# Patient Record
Sex: FEMALE | Race: WHITE | NOT HISPANIC OR LATINO | Employment: OTHER | URBAN - METROPOLITAN AREA
[De-identification: names, ages, dates, MRNs, and addresses within clinical notes are randomized per-mention and may not be internally consistent; named-entity substitution may affect disease eponyms.]

---

## 2022-11-23 ENCOUNTER — HOSPITAL ENCOUNTER (EMERGENCY)
Facility: HOSPITAL | Age: 77
Discharge: HOME | End: 2022-11-23
Attending: EMERGENCY MEDICINE
Payer: MEDICARE

## 2022-11-23 ENCOUNTER — TELEPHONE (OUTPATIENT)
Dept: ORTHOPEDICS | Facility: CLINIC | Age: 77
End: 2022-11-23
Payer: MEDICARE

## 2022-11-23 VITALS
RESPIRATION RATE: 18 BRPM | OXYGEN SATURATION: 97 % | WEIGHT: 160 LBS | DIASTOLIC BLOOD PRESSURE: 87 MMHG | BODY MASS INDEX: 26.66 KG/M2 | HEIGHT: 65 IN | TEMPERATURE: 98 F | HEART RATE: 78 BPM | SYSTOLIC BLOOD PRESSURE: 153 MMHG
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
## 2022-11-23 DIAGNOSIS — S42.201A CLOSED FRACTURE OF PROXIMAL END OF RIGHT HUMERUS, UNSPECIFIED FRACTURE MORPHOLOGY, INITIAL ENCOUNTER: Primary | ICD-10-CM

## 2022-11-23 PROCEDURE — 99283 EMERGENCY DEPT VISIT LOW MDM: CPT

## 2022-11-23 PROCEDURE — 25000003 PHARM REV CODE 250: Performed by: EMERGENCY MEDICINE

## 2022-11-23 RX ORDER — HYDROCODONE BITARTRATE AND ACETAMINOPHEN 5; 325 MG/1; MG/1
1 TABLET ORAL EVERY 4 HOURS PRN
Qty: 18 TABLET | Refills: 0 | Status: SHIPPED | OUTPATIENT
Start: 2022-11-23 | End: 2022-12-19

## 2022-11-23 RX ORDER — HYDROCODONE BITARTRATE AND ACETAMINOPHEN 5; 325 MG/1; MG/1
1 TABLET ORAL
Status: COMPLETED | OUTPATIENT
Start: 2022-11-23 | End: 2022-11-23

## 2022-11-23 RX ADMIN — HYDROCODONE BITARTRATE AND ACETAMINOPHEN 1 TABLET: 5; 325 TABLET ORAL at 08:11

## 2022-11-23 NOTE — DISCHARGE INSTRUCTIONS
Maintain the sling and swath at all times.  Neurovascular checks the fingers.  Ice pack to the shoulder for 48 hours.  Norco every 4-6 hours if needed for pain.  Return to the ER as needed.  Make your appointment with the orthopedist today to be seen early next week.

## 2022-11-23 NOTE — ED NOTES
L arm pain S/P fall.  Good R hand movement> Pulses ++R wrist. Alert and oriented. Denies LOC. Home made T shirt sling in place. No RD.  Non distended abdomen.  Falls precautions. Call light in reach.

## 2022-11-23 NOTE — TELEPHONE ENCOUNTER
----- Message from Araceli Miller MA sent at 11/23/2022 10:57 AM CST -----  Contact:   darius   is panicking   Just left hospital   Call back

## 2022-11-23 NOTE — ED PROVIDER NOTES
Encounter Date: 11/23/2022       History     Chief Complaint   Patient presents with    Shoulder Injury     Patient c/o R shoulder injury after slip and fall this AM, denies LOC      77-year-old female who has a benign past medical history, presents emergency room accompanied by  with a history the patient stepped out of bed to go to bathroom at 4:00 a.m. this morning and slipped on a marble floor, landing on her right shoulder.  Since that time she is had intense pain in the shoulder area.  No complaints of any pain to the right elbow forearm hand or wrist.  No numbness to the hand or fingers.  She denies any head or neck pain.  No complaints of any pain to the anterior chest, abdomen or back.  No pelvic pain.  No pain to the left upper extremity.    Review of patient's allergies indicates:  No Known Allergies  No past medical history on file.  No past surgical history on file.  No family history on file.     Review of Systems   Constitutional:  Negative for chills, diaphoresis and fever.   HENT: Negative.  Negative for sore throat.    Respiratory: Negative.  Negative for shortness of breath.    Cardiovascular: Negative.  Negative for chest pain.   Gastrointestinal: Negative.  Negative for nausea.   Musculoskeletal:  Positive for arthralgias, joint swelling and myalgias. Negative for back pain and neck pain.   Skin:  Negative for color change and rash.   Neurological:  Negative for weakness and numbness.   All other systems reviewed and are negative.    Physical Exam     Initial Vitals [11/23/22 0641]   BP Pulse Resp Temp SpO2   122/81 100 18 98.1 °F (36.7 °C) 99 %      MAP       --         Physical Exam    Vitals reviewed.  Constitutional: She appears well-developed and well-nourished. She is not diaphoretic. No distress.   HENT:   Head: Normocephalic and atraumatic.   Nose: Nose normal.   Mouth/Throat: Oropharynx is clear and moist. No oropharyngeal exudate.   Eyes: Conjunctivae are normal. Pupils are  equal, round, and reactive to light.   Neck: Neck supple. No JVD present.   Normal range of motion.  Cardiovascular:  Normal rate, regular rhythm, normal heart sounds and intact distal pulses.     Exam reveals no gallop and no friction rub.       No murmur heard.  Pulmonary/Chest: Breath sounds normal. No respiratory distress. She has no wheezes. She has no rhonchi. She has no rales. She exhibits no tenderness.   Abdominal: Abdomen is soft. Bowel sounds are normal. She exhibits no distension. There is no abdominal tenderness. There is no rebound and no guarding.   Musculoskeletal:         General: No tenderness or edema. Normal range of motion.      Cervical back: Normal range of motion and neck supple.      Comments: Exquisite pain to palpation of the right deltoid area.  No pain over the distal humerus, elbow, forearm, wrist or hand.  Capillary refill is normal.  Sensations intact.     Lymphadenopathy:     She has no cervical adenopathy.   Neurological: She is alert and oriented to person, place, and time. She has normal strength. No sensory deficit. GCS score is 15. GCS eye subscore is 4. GCS verbal subscore is 5. GCS motor subscore is 6.   Skin: Skin is warm and dry. Capillary refill takes less than 2 seconds. No rash noted. No erythema. No pallor.   Psychiatric: She has a normal mood and affect. Her behavior is normal. Judgment and thought content normal.       ED Course   Procedures  Labs Reviewed - No data to display       Imaging Results              X-Ray Shoulder Trauma Right (In process)                      Medications   HYDROcodone-acetaminophen 5-325 mg per tablet 1 tablet (has no administration in time range)                Attending Attestation:             Attending ED Notes:   This 77-year-old female who had slipped and fell landing on her right shoulder this morning, now with right shoulder pain, has evidence of a fracture to the surgical neck of the humerus that is comminuted.  She was to be  placed in a sling and swath given analgesics.  She is to follow up with Dr. Liang orthopedic surgery next week.  She is to maintain an ice pack 48 hours and take Norco as needed for pain.                 Clinical Impression:   Final diagnoses:  [W19.XXXA] Fall  [S42.201A] Closed fracture of proximal end of right humerus, unspecified fracture morphology, initial encounter (Primary)        ED Disposition Condition    Discharge Stable          ED Prescriptions       Medication Sig Dispense Start Date End Date Auth. Provider    HYDROcodone-acetaminophen (NORCO) 5-325 mg per tablet Take 1 tablet by mouth every 4 (four) hours as needed for Pain. 18 tablet 11/23/2022 -- Dominik Sebastian Jr., MD          Follow-up Information       Follow up With Specialties Details Why Contact Info    Kenny Liang MD Sports Medicine, Orthopedic Surgery Schedule an appointment as soon as possible for a visit in 5 days  [address] [place] [address] [address] [place] [address]  [phone]               Dominik Sebastian Jr., MD  11/23/22 0803       Dominik Sebastian Jr., MD  11/23/22 8159

## 2022-11-23 NOTE — TELEPHONE ENCOUNTER
Spoke with patients    Told him to have patient take pain medication as prescribed and can ice shoulder  Scheduled appointment for Monday

## 2022-11-28 ENCOUNTER — OFFICE VISIT (OUTPATIENT)
Dept: ORTHOPEDICS | Facility: CLINIC | Age: 77
End: 2022-11-28
Payer: MEDICARE

## 2022-11-28 VITALS — HEIGHT: 65 IN | BODY MASS INDEX: 26.66 KG/M2 | RESPIRATION RATE: 18 BRPM | WEIGHT: 160 LBS

## 2022-11-28 DIAGNOSIS — S42.291A CLOSED 3-PART FRACTURE OF PROXIMAL HUMERUS, RIGHT, INITIAL ENCOUNTER: Primary | ICD-10-CM

## 2022-11-28 PROCEDURE — 99999 PR PBB SHADOW E&M-EST. PATIENT-LVL III: CPT | Mod: PBBFAC,,, | Performed by: ORTHOPAEDIC SURGERY

## 2022-11-28 PROCEDURE — 23600 CLTX PROX HUMRL FX W/O MNPJ: CPT | Mod: RT,S$GLB,, | Performed by: ORTHOPAEDIC SURGERY

## 2022-11-28 PROCEDURE — 99204 OFFICE O/P NEW MOD 45 MIN: CPT | Mod: 57,S$GLB,, | Performed by: ORTHOPAEDIC SURGERY

## 2022-11-28 PROCEDURE — 23600 PR CLOSED RX PROX HUMERUS FRACTURE: ICD-10-PCS | Mod: RT,S$GLB,, | Performed by: ORTHOPAEDIC SURGERY

## 2022-11-28 PROCEDURE — 99999 PR PBB SHADOW E&M-EST. PATIENT-LVL III: ICD-10-PCS | Mod: PBBFAC,,, | Performed by: ORTHOPAEDIC SURGERY

## 2022-11-28 PROCEDURE — 99204 PR OFFICE/OUTPT VISIT, NEW, LEVL IV, 45-59 MIN: ICD-10-PCS | Mod: 57,S$GLB,, | Performed by: ORTHOPAEDIC SURGERY

## 2022-11-28 RX ORDER — OXYCODONE AND ACETAMINOPHEN 5; 325 MG/1; MG/1
1 TABLET ORAL EVERY 4 HOURS PRN
Qty: 28 TABLET | Refills: 0 | Status: SHIPPED | OUTPATIENT
Start: 2022-11-28 | End: 2022-12-19

## 2022-11-28 NOTE — PROGRESS NOTES
History reviewed. No pertinent past medical history.    History reviewed. No pertinent surgical history.    Current Outpatient Medications   Medication Sig    HYDROcodone-acetaminophen (NORCO) 5-325 mg per tablet Take 1 tablet by mouth every 4 (four) hours as needed for Pain.     No current facility-administered medications for this visit.       Review of patient's allergies indicates:  No Known Allergies    History reviewed. No pertinent family history.    Social History     Socioeconomic History    Marital status:        Chief Complaint:   Chief Complaint   Patient presents with    Right Shoulder - Injury       History of present illness:  This is a 77-year-old female seen after a fall on November 23rd.  Patient was seen at the hospital ER and diagnosed with a comminuted right proximal humerus fracture.  Patient slipped getting out of bed landed directly onto her right shoulder.  Pain is a 10/10.  She tried taking Norco but it made her sick.      Review of Systems:    Constitution: Negative for chills, fever, and sweats.  Negative for unexplained weight loss.    HENT:  Negative for headaches and blurry vision.    Cardiovascular:Negative for chest pain or irregular heart beat. Negative for hypertension.    Respiratory:  Negative for cough and shortness of breath.    Gastrointestinal: Negative for abdominal pain, heartburn, melena, nausea, and vomitting.    Genitourinary:  Negative bladder incontinence and dysuria.    Musculoskeletal:  See HPI    Neurological: Negative for numbness.    Psychiatric/Behavioral: Negative for depression.  The patient is not nervous/anxious.      Endocrine: Negative for polyuria    Hematologic/Lymphatic: Negative for bleeding problem.  Does not bruise/bleed easily.    Skin: Negative for poor would healing and rash      Physical Examination:    Vital Signs:    Vitals:    11/28/22 0844   Resp: 18       Body mass index is 26.63 kg/m².    This a well-developed, well nourished patient  in no acute distress.  They are alert and oriented and cooperative to examination.  Pt. walks without an antalgic gait.      Examination of the right shoulder shows moderate bruising and swelling in the shoulder and arm.  Patient has significant limitation range of motion due to pain and guarding.  Able to wiggle her thumb in move her fingers.  Intact light touch sensation over the deltoid and in the hand.    X-rays:  X-rays of the right shoulder ordered and reviewed which show comminuted right proximal humerus fracture with minimal displacement overall good alignment.     Assessment::  Right proximal humerus fracture    Plan:  Reviewed the findings with her and her family today.  Recommended non operative care given the good alignment of the fracture.  Placed her in an UltraSling today.  Okay to come out of this for shower only.  We will try her on Percocet to see if that is tolerated.  If not, we will try Ultram.  I will see her back in 3 weeks with another x-ray of the right shoulder.  We will possibly start some physical therapy at that point.    This note was created using IES voice recognition software that occasionally misinterpreted phrases or words.    Consult note is delivered via Epic messaging service.

## 2022-12-14 DIAGNOSIS — S42.291A CLOSED 3-PART FRACTURE OF PROXIMAL HUMERUS, RIGHT, INITIAL ENCOUNTER: Primary | ICD-10-CM

## 2022-12-19 ENCOUNTER — HOSPITAL ENCOUNTER (OUTPATIENT)
Dept: RADIOLOGY | Facility: HOSPITAL | Age: 77
Discharge: HOME | End: 2022-12-19
Attending: ORTHOPAEDIC SURGERY
Payer: MEDICARE

## 2022-12-19 ENCOUNTER — OFFICE VISIT (OUTPATIENT)
Dept: ORTHOPEDICS | Facility: CLINIC | Age: 77
End: 2022-12-19
Payer: MEDICARE

## 2022-12-19 VITALS — WEIGHT: 160 LBS | RESPIRATION RATE: 18 BRPM | HEIGHT: 65 IN | BODY MASS INDEX: 26.66 KG/M2

## 2022-12-19 DIAGNOSIS — S42.291A CLOSED 3-PART FRACTURE OF PROXIMAL HUMERUS, RIGHT, INITIAL ENCOUNTER: ICD-10-CM

## 2022-12-19 DIAGNOSIS — S42.291D CLOSED 3-PART FRACTURE OF PROXIMAL HUMERUS WITH ROUTINE HEALING, RIGHT: Primary | ICD-10-CM

## 2022-12-19 PROCEDURE — 3288F PR FALLS RISK ASSESSMENT DOCUMENTED: ICD-10-PCS | Mod: CPTII,S$GLB,, | Performed by: ORTHOPAEDIC SURGERY

## 2022-12-19 PROCEDURE — 1125F AMNT PAIN NOTED PAIN PRSNT: CPT | Mod: CPTII,S$GLB,, | Performed by: ORTHOPAEDIC SURGERY

## 2022-12-19 PROCEDURE — 1160F RVW MEDS BY RX/DR IN RCRD: CPT | Mod: CPTII,S$GLB,, | Performed by: ORTHOPAEDIC SURGERY

## 2022-12-19 PROCEDURE — 99024 POSTOP FOLLOW-UP VISIT: CPT | Mod: S$GLB,,, | Performed by: ORTHOPAEDIC SURGERY

## 2022-12-19 PROCEDURE — 99999 PR PBB SHADOW E&M-EST. PATIENT-LVL III: CPT | Mod: PBBFAC,,, | Performed by: ORTHOPAEDIC SURGERY

## 2022-12-19 PROCEDURE — 1159F PR MEDICATION LIST DOCUMENTED IN MEDICAL RECORD: ICD-10-PCS | Mod: CPTII,S$GLB,, | Performed by: ORTHOPAEDIC SURGERY

## 2022-12-19 PROCEDURE — 73030 XR SHOULDER COMPLETE 2 OR MORE VIEWS RIGHT: ICD-10-PCS | Mod: 26,RT,, | Performed by: RADIOLOGY

## 2022-12-19 PROCEDURE — 99024 PR POST-OP FOLLOW-UP VISIT: ICD-10-PCS | Mod: S$GLB,,, | Performed by: ORTHOPAEDIC SURGERY

## 2022-12-19 PROCEDURE — 1159F MED LIST DOCD IN RCRD: CPT | Mod: CPTII,S$GLB,, | Performed by: ORTHOPAEDIC SURGERY

## 2022-12-19 PROCEDURE — 1160F PR REVIEW ALL MEDS BY PRESCRIBER/CLIN PHARMACIST DOCUMENTED: ICD-10-PCS | Mod: CPTII,S$GLB,, | Performed by: ORTHOPAEDIC SURGERY

## 2022-12-19 PROCEDURE — 3288F FALL RISK ASSESSMENT DOCD: CPT | Mod: CPTII,S$GLB,, | Performed by: ORTHOPAEDIC SURGERY

## 2022-12-19 PROCEDURE — 4010F PR ACE/ARB THEARPY RXD/TAKEN: ICD-10-PCS | Mod: CPTII,S$GLB,, | Performed by: ORTHOPAEDIC SURGERY

## 2022-12-19 PROCEDURE — 4010F ACE/ARB THERAPY RXD/TAKEN: CPT | Mod: CPTII,S$GLB,, | Performed by: ORTHOPAEDIC SURGERY

## 2022-12-19 PROCEDURE — 1101F PR PT FALLS ASSESS DOC 0-1 FALLS W/OUT INJ PAST YR: ICD-10-PCS | Mod: CPTII,S$GLB,, | Performed by: ORTHOPAEDIC SURGERY

## 2022-12-19 PROCEDURE — 99999 PR PBB SHADOW E&M-EST. PATIENT-LVL III: ICD-10-PCS | Mod: PBBFAC,,, | Performed by: ORTHOPAEDIC SURGERY

## 2022-12-19 PROCEDURE — 1125F PR PAIN SEVERITY QUANTIFIED, PAIN PRESENT: ICD-10-PCS | Mod: CPTII,S$GLB,, | Performed by: ORTHOPAEDIC SURGERY

## 2022-12-19 PROCEDURE — 1101F PT FALLS ASSESS-DOCD LE1/YR: CPT | Mod: CPTII,S$GLB,, | Performed by: ORTHOPAEDIC SURGERY

## 2022-12-19 PROCEDURE — 73030 X-RAY EXAM OF SHOULDER: CPT | Mod: TC,PN,RT

## 2022-12-19 PROCEDURE — 73030 X-RAY EXAM OF SHOULDER: CPT | Mod: 26,RT,, | Performed by: RADIOLOGY

## 2022-12-19 RX ORDER — OXYCODONE AND ACETAMINOPHEN 5; 325 MG/1; MG/1
1 TABLET ORAL EVERY 6 HOURS PRN
Qty: 28 TABLET | Refills: 0 | Status: SHIPPED | OUTPATIENT
Start: 2022-12-19 | End: 2023-01-09 | Stop reason: SDUPTHER

## 2022-12-19 NOTE — PROGRESS NOTES
History reviewed. No pertinent past medical history.    History reviewed. No pertinent surgical history.    Current Outpatient Medications   Medication Sig    HYDROcodone-acetaminophen (NORCO) 5-325 mg per tablet Take 1 tablet by mouth every 4 (four) hours as needed for Pain. (Patient not taking: Reported on 12/19/2022)    oxyCODONE-acetaminophen (PERCOCET) 5-325 mg per tablet Take 1 tablet by mouth every 4 (four) hours as needed for Pain. (Patient not taking: Reported on 12/19/2022)     No current facility-administered medications for this visit.       Review of patient's allergies indicates:  No Known Allergies    History reviewed. No pertinent family history.    Social History     Socioeconomic History    Marital status:        Chief Complaint:   Chief Complaint   Patient presents with    Follow-up     follow up post right proximal humerus justice, doi 11/23/22       History of present illness:  This is a 77-year-old female seen after a fall on November 23rd.  Patient was seen at the hospital ER and diagnosed with a comminuted right proximal humerus fracture.  Patient slipped getting out of bed landed directly onto her right shoulder.  Pain is a 8/10.  Wearing the sling      Review of Systems:    Constitution: Negative for chills, fever, and sweats.  Negative for unexplained weight loss.    HENT:  Negative for headaches and blurry vision.    Cardiovascular:Negative for chest pain or irregular heart beat. Negative for hypertension.    Respiratory:  Negative for cough and shortness of breath.    Gastrointestinal: Negative for abdominal pain, heartburn, melena, nausea, and vomitting.    Genitourinary:  Negative bladder incontinence and dysuria.    Musculoskeletal:  See HPI    Neurological: Negative for numbness.    Psychiatric/Behavioral: Negative for depression.  The patient is not nervous/anxious.      Endocrine: Negative for polyuria    Hematologic/Lymphatic: Negative for bleeding problem.  Does not  bruise/bleed easily.    Skin: Negative for poor would healing and rash      Physical Examination:    Vital Signs:    Vitals:    12/19/22 0909   Resp: 18       Body mass index is 26.63 kg/m².    This a well-developed, well nourished patient in no acute distress.  They are alert and oriented and cooperative to examination.  Pt. walks without an antalgic gait.      Examination of the right shoulder shows resolved bruising and still has a little swelling in the shoulder and arm.  Patient has significant limitation range of motion due to pain and guarding.  Able to wiggle her thumb in move her fingers.  Intact light touch sensation over the deltoid and in the hand.    X-rays:  X-rays of the right shoulder ordered and reviewed which show comminuted right proximal humerus fracture with minimal displacement.  overall acceptable alignment.  Some inferior subluxation now     Assessment::  Right proximal humerus fracture    Plan:  Reviewed the findings with her and her family today.  Recommended non operative care in the sling still.  Refilled her Percocet.  Take them only as needed.  I will see her back in a month with another x-ray of the right shoulder.    This note was created using [employer] voice recognition software that occasionally misinterpreted phrases or words.    Consult note is delivered via Epic messaging service.

## 2023-01-09 DIAGNOSIS — S42.291A CLOSED 3-PART FRACTURE OF PROXIMAL HUMERUS, RIGHT, INITIAL ENCOUNTER: ICD-10-CM

## 2023-01-09 RX ORDER — OXYCODONE AND ACETAMINOPHEN 5; 325 MG/1; MG/1
1 TABLET ORAL EVERY 6 HOURS PRN
Qty: 28 TABLET | Refills: 0 | Status: SHIPPED | OUTPATIENT
Start: 2023-01-09 | End: 2023-01-18 | Stop reason: SDUPTHER

## 2023-01-18 DIAGNOSIS — S42.291A CLOSED 3-PART FRACTURE OF PROXIMAL HUMERUS, RIGHT, INITIAL ENCOUNTER: ICD-10-CM

## 2023-01-18 RX ORDER — OXYCODONE AND ACETAMINOPHEN 5; 325 MG/1; MG/1
1 TABLET ORAL EVERY 6 HOURS PRN
Qty: 28 TABLET | Refills: 0 | Status: SHIPPED | OUTPATIENT
Start: 2023-01-18 | End: 2023-01-26

## 2023-01-24 DIAGNOSIS — M25.511 RIGHT SHOULDER PAIN, UNSPECIFIED CHRONICITY: Primary | ICD-10-CM

## 2023-01-26 DIAGNOSIS — S42.291D CLOSED 3-PART FRACTURE OF PROXIMAL HUMERUS WITH ROUTINE HEALING, RIGHT: Primary | ICD-10-CM

## 2023-01-26 RX ORDER — HYDROCODONE BITARTRATE AND ACETAMINOPHEN 5; 325 MG/1; MG/1
1 TABLET ORAL EVERY 6 HOURS PRN
Qty: 28 TABLET | Refills: 0 | Status: SHIPPED | OUTPATIENT
Start: 2023-01-26 | End: 2023-02-02 | Stop reason: SDUPTHER

## 2023-01-26 NOTE — TELEPHONE ENCOUNTER
----- Message from Sharon Schuster sent at 1/26/2023  8:23 AM CST -----  Contact: pt  Type:  RX Refill Request    Who Called: pt    Refill or New Rx:refill    RX Name and Strength: oxyCODONE-acetaminophen (PERCOCET) 5-325 mg per tablet    How is the patient currently taking it? (ex. 1XDay):as directed    Is this a 30 day or 90 day RX:30    Preferred Pharmacy with phone number:      [facility] (Retail Pharmacy) - [facility] [address] [address]  [address] [facility] [address]  Phone: [phone] Fax: [phone]      Local or Mail Order:local    Ordering Provider:hilario    Would the patient rather a call back or a response via [facility]? call  Best Call Back Number:[phone]     Additional Information: states that she's in pain and has an appt reese for feb. 3. States that she needs something until appt. Please advise thank you

## 2023-01-26 NOTE — TELEPHONE ENCOUNTER
----- Message from Javier Echeverria sent at 1/26/2023  2:10 PM CST -----  Type: Needs Medical Advice  Who Called:  Patient    Pharmacy name and phone #:        [facility] (Retail Pharmacy) - [place], [place] - [address] [address]  [address] [facility] [address]  Phone: [phone] Fax: [phone]      Best Call Back Number: [phone]  Additional Information: Patient states that her refill isn't at the pharmacy:  oxyCODONE-acetaminophen (PERCOCET) 5-325 mg per tablet    Please call ASAP

## 2023-01-26 NOTE — TELEPHONE ENCOUNTER
----- Message from Araceli Miller MA sent at 1/26/2023  4:08 PM CST -----  Contact: pt  States hyrocodone refill   [facility]   Call back

## 2023-02-02 ENCOUNTER — OFFICE VISIT (OUTPATIENT)
Dept: ORTHOPEDICS | Facility: CLINIC | Age: 78
End: 2023-02-02
Payer: MEDICARE

## 2023-02-02 ENCOUNTER — HOSPITAL ENCOUNTER (OUTPATIENT)
Dept: RADIOLOGY | Facility: HOSPITAL | Age: 78
Discharge: HOME | End: 2023-02-02
Attending: ORTHOPAEDIC SURGERY
Payer: MEDICARE

## 2023-02-02 VITALS — BODY MASS INDEX: 26.66 KG/M2 | HEIGHT: 65 IN | RESPIRATION RATE: 18 BRPM | WEIGHT: 160 LBS

## 2023-02-02 DIAGNOSIS — M25.511 RIGHT SHOULDER PAIN, UNSPECIFIED CHRONICITY: Primary | ICD-10-CM

## 2023-02-02 DIAGNOSIS — M25.511 RIGHT SHOULDER PAIN, UNSPECIFIED CHRONICITY: ICD-10-CM

## 2023-02-02 DIAGNOSIS — S42.291D CLOSED 3-PART FRACTURE OF PROXIMAL HUMERUS WITH ROUTINE HEALING, RIGHT: ICD-10-CM

## 2023-02-02 DIAGNOSIS — S42.291D CLOSED 3-PART FRACTURE OF PROXIMAL HUMERUS WITH ROUTINE HEALING, RIGHT: Primary | ICD-10-CM

## 2023-02-02 PROCEDURE — 1101F PT FALLS ASSESS-DOCD LE1/YR: CPT | Mod: CPTII,S$GLB,, | Performed by: ORTHOPAEDIC SURGERY

## 2023-02-02 PROCEDURE — 99024 PR POST-OP FOLLOW-UP VISIT: ICD-10-PCS | Mod: S$GLB,,, | Performed by: ORTHOPAEDIC SURGERY

## 2023-02-02 PROCEDURE — 1159F MED LIST DOCD IN RCRD: CPT | Mod: CPTII,S$GLB,, | Performed by: ORTHOPAEDIC SURGERY

## 2023-02-02 PROCEDURE — 73030 X-RAY EXAM OF SHOULDER: CPT | Mod: TC,PN,RT

## 2023-02-02 PROCEDURE — 1159F PR MEDICATION LIST DOCUMENTED IN MEDICAL RECORD: ICD-10-PCS | Mod: CPTII,S$GLB,, | Performed by: ORTHOPAEDIC SURGERY

## 2023-02-02 PROCEDURE — 1160F PR REVIEW ALL MEDS BY PRESCRIBER/CLIN PHARMACIST DOCUMENTED: ICD-10-PCS | Mod: CPTII,S$GLB,, | Performed by: ORTHOPAEDIC SURGERY

## 2023-02-02 PROCEDURE — 1160F RVW MEDS BY RX/DR IN RCRD: CPT | Mod: CPTII,S$GLB,, | Performed by: ORTHOPAEDIC SURGERY

## 2023-02-02 PROCEDURE — 99024 POSTOP FOLLOW-UP VISIT: CPT | Mod: S$GLB,,, | Performed by: ORTHOPAEDIC SURGERY

## 2023-02-02 PROCEDURE — 73030 X-RAY EXAM OF SHOULDER: CPT | Mod: 26,RT,, | Performed by: RADIOLOGY

## 2023-02-02 PROCEDURE — 3288F PR FALLS RISK ASSESSMENT DOCUMENTED: ICD-10-PCS | Mod: CPTII,S$GLB,, | Performed by: ORTHOPAEDIC SURGERY

## 2023-02-02 PROCEDURE — 99999 PR PBB SHADOW E&M-EST. PATIENT-LVL II: ICD-10-PCS | Mod: PBBFAC,,, | Performed by: ORTHOPAEDIC SURGERY

## 2023-02-02 PROCEDURE — 99999 PR PBB SHADOW E&M-EST. PATIENT-LVL II: CPT | Mod: PBBFAC,,, | Performed by: ORTHOPAEDIC SURGERY

## 2023-02-02 PROCEDURE — 3288F FALL RISK ASSESSMENT DOCD: CPT | Mod: CPTII,S$GLB,, | Performed by: ORTHOPAEDIC SURGERY

## 2023-02-02 PROCEDURE — 73030 XR SHOULDER TRAUMA 3 VIEW RIGHT: ICD-10-PCS | Mod: 26,RT,, | Performed by: RADIOLOGY

## 2023-02-02 PROCEDURE — 1101F PR PT FALLS ASSESS DOC 0-1 FALLS W/OUT INJ PAST YR: ICD-10-PCS | Mod: CPTII,S$GLB,, | Performed by: ORTHOPAEDIC SURGERY

## 2023-02-02 RX ORDER — HYDROCODONE BITARTRATE AND ACETAMINOPHEN 5; 325 MG/1; MG/1
1 TABLET ORAL EVERY 6 HOURS PRN
Qty: 28 TABLET | Refills: 0 | Status: SHIPPED | OUTPATIENT
Start: 2023-02-02 | End: 2023-12-13

## 2023-02-02 NOTE — PROGRESS NOTES
History reviewed. No pertinent past medical history.    History reviewed. No pertinent surgical history.    Current Outpatient Medications   Medication Sig    HYDROcodone-acetaminophen (NORCO) 5-325 mg per tablet Take 1 tablet by mouth every 6 (six) hours as needed for Pain.     No current facility-administered medications for this visit.       Review of patient's allergies indicates:  No Known Allergies    History reviewed. No pertinent family history.    Social History     Socioeconomic History    Marital status:        Chief Complaint:   No chief complaint on file.      History of present illness:  This is a 77-year-old female seen after a fall on November 23rd.  Patient was seen at the hospital ER and diagnosed with a comminuted right proximal humerus fracture.  Patient slipped getting out of bed landed directly onto her right shoulder.  Pain is a 8/10.  Wearing the sling.  Right elbow and hand are more painful than the shoulder.  Pain keeps her up at night.  Getting better though.      Review of Systems:    Constitution: Negative for chills, fever, and sweats.  Negative for unexplained weight loss.    HENT:  Negative for headaches and blurry vision.    Cardiovascular:Negative for chest pain or irregular heart beat. Negative for hypertension.    Respiratory:  Negative for cough and shortness of breath.    Gastrointestinal: Negative for abdominal pain, heartburn, melena, nausea, and vomitting.    Genitourinary:  Negative bladder incontinence and dysuria.    Musculoskeletal:  See HPI    Neurological: Negative for numbness.    Psychiatric/Behavioral: Negative for depression.  The patient is not nervous/anxious.      Endocrine: Negative for polyuria    Hematologic/Lymphatic: Negative for bleeding problem.  Does not bruise/bleed easily.    Skin: Negative for poor would healing and rash      Physical Examination:    Vital Signs:    Vitals:    02/02/23 1442   Resp: 18       Body mass index is 26.63  kg/m².    This a well-developed, well nourished patient in no acute distress.  They are alert and oriented and cooperative to examination.  Pt. walks without an antalgic gait.      Examination of the right shoulder shows resolved bruising and still has a little swelling in the shoulder and arm.  Patient has significant limitation range of motion due to pain and guarding.  Able to wiggle her thumb in move her fingers.  Intact light touch sensation over the deltoid and in the hand.  Complains of numbness in the ulnar 3 digits.    X-rays:  X-rays of the right shoulder ordered and reviewed which show comminuted right proximal humerus fracture with minimal displacement.  overall acceptable alignment.  Some inferior subluxation now     Assessment::  Right proximal humerus fracture  Right ulnar neuropathy    Plan:  Reviewed the findings with her and her family today.  We will stop the sling.  Start some therapy on her arm.  Also ordered some PT for generalized conditioning.  Follow-up in 2 weeks with another x-ray of the right shoulder.    This note was created using M [employer] voice recognition software that occasionally misinterpreted phrases or words.    Consult note is delivered via Epic messaging service.

## 2023-02-03 ENCOUNTER — TELEPHONE (OUTPATIENT)
Dept: ORTHOPEDICS | Facility: CLINIC | Age: 78
End: 2023-02-03
Payer: MEDICARE

## 2023-02-03 NOTE — TELEPHONE ENCOUNTER
Abraham Liang MD; DANY Robertson Staff  Thank you for your referral to [facility]. We are unable to provide an Occupational Therapist due to staffing. We can have our Physical Therapist admit to home health and to assist as much as possible with OT needs. Is this okay? Please advise.

## 2023-02-07 PROCEDURE — G0180 PR HOME HEALTH MD CERTIFICATION: ICD-10-PCS | Mod: ,,, | Performed by: ORTHOPAEDIC SURGERY

## 2023-02-07 PROCEDURE — G0180 MD CERTIFICATION HHA PATIENT: HCPCS | Mod: ,,, | Performed by: ORTHOPAEDIC SURGERY

## 2023-02-27 ENCOUNTER — TELEPHONE (OUTPATIENT)
Dept: ORTHOPEDICS | Facility: CLINIC | Age: 78
End: 2023-02-27
Payer: MEDICARE

## 2023-02-27 DIAGNOSIS — S42.291D CLOSED 3-PART FRACTURE OF PROXIMAL HUMERUS WITH ROUTINE HEALING, RIGHT: Primary | ICD-10-CM

## 2023-02-27 NOTE — TELEPHONE ENCOUNTER
----- Message from Araceli Miller MA sent at 2/27/2023  1:02 PM CST -----  Contact: danae MARINO  Bedside commode needs order   Call back [phone]

## 2023-02-27 NOTE — TELEPHONE ENCOUNTER
[phone].   Left message stating order is in and asking for where they would like order sent.

## 2023-03-08 ENCOUNTER — EXTERNAL HOME HEALTH (OUTPATIENT)
Dept: HOME HEALTH SERVICES | Facility: HOSPITAL | Age: 78
End: 2023-03-08
Payer: MEDICARE

## 2023-03-08 ENCOUNTER — TELEPHONE (OUTPATIENT)
Dept: ORTHOPEDICS | Facility: CLINIC | Age: 78
End: 2023-03-08
Payer: MEDICARE

## 2023-03-08 DIAGNOSIS — S42.291D CLOSED 3-PART FRACTURE OF PROXIMAL HUMERUS WITH ROUTINE HEALING, RIGHT: Primary | ICD-10-CM

## 2023-03-08 NOTE — TELEPHONE ENCOUNTER
----- Message from Erica Xavier MA sent at 3/8/2023  3:26 PM CST -----  Contact: donya MARINO  Can you put these orders in please!  ----- Message -----  From: Araceli Miller MA  Sent: 3/8/2023   2:24 PM CST  To: Garth Robertson Staff    Orders to continue HH PT   Call back

## 2023-03-09 DIAGNOSIS — M25.511 RIGHT SHOULDER PAIN, UNSPECIFIED CHRONICITY: Primary | ICD-10-CM

## 2023-03-16 ENCOUNTER — OFFICE VISIT (OUTPATIENT)
Dept: ORTHOPEDICS | Facility: CLINIC | Age: 78
End: 2023-03-16
Payer: MEDICARE

## 2023-03-16 ENCOUNTER — HOSPITAL ENCOUNTER (OUTPATIENT)
Dept: RADIOLOGY | Facility: HOSPITAL | Age: 78
Discharge: HOME | End: 2023-03-16
Attending: ORTHOPAEDIC SURGERY
Payer: MEDICARE

## 2023-03-16 DIAGNOSIS — M25.511 RIGHT SHOULDER PAIN, UNSPECIFIED CHRONICITY: ICD-10-CM

## 2023-03-16 DIAGNOSIS — S42.291D CLOSED 3-PART FRACTURE OF PROXIMAL HUMERUS WITH ROUTINE HEALING, RIGHT: ICD-10-CM

## 2023-03-16 DIAGNOSIS — S42.291D CLOSED 3-PART FRACTURE OF PROXIMAL HUMERUS WITH ROUTINE HEALING, RIGHT: Primary | ICD-10-CM

## 2023-03-16 PROCEDURE — 73030 X-RAY EXAM OF SHOULDER: CPT | Mod: 26,RT,, | Performed by: RADIOLOGY

## 2023-03-16 PROCEDURE — 99214 PR OFFICE/OUTPT VISIT, EST, LEVL IV, 30-39 MIN: ICD-10-PCS | Mod: S$GLB,,, | Performed by: ORTHOPAEDIC SURGERY

## 2023-03-16 PROCEDURE — 73030 XR SHOULDER TRAUMA 3 VIEW RIGHT: ICD-10-PCS | Mod: 26,RT,, | Performed by: RADIOLOGY

## 2023-03-16 PROCEDURE — 73030 X-RAY EXAM OF SHOULDER: CPT | Mod: TC,PN,RT

## 2023-03-16 PROCEDURE — 1159F MED LIST DOCD IN RCRD: CPT | Mod: CPTII,S$GLB,, | Performed by: ORTHOPAEDIC SURGERY

## 2023-03-16 PROCEDURE — 1159F PR MEDICATION LIST DOCUMENTED IN MEDICAL RECORD: ICD-10-PCS | Mod: CPTII,S$GLB,, | Performed by: ORTHOPAEDIC SURGERY

## 2023-03-16 PROCEDURE — 1160F PR REVIEW ALL MEDS BY PRESCRIBER/CLIN PHARMACIST DOCUMENTED: ICD-10-PCS | Mod: CPTII,S$GLB,, | Performed by: ORTHOPAEDIC SURGERY

## 2023-03-16 PROCEDURE — 1160F RVW MEDS BY RX/DR IN RCRD: CPT | Mod: CPTII,S$GLB,, | Performed by: ORTHOPAEDIC SURGERY

## 2023-03-16 PROCEDURE — 99214 OFFICE O/P EST MOD 30 MIN: CPT | Mod: S$GLB,,, | Performed by: ORTHOPAEDIC SURGERY

## 2023-03-16 RX ORDER — OXYCODONE AND ACETAMINOPHEN 5; 325 MG/1; MG/1
1 TABLET ORAL EVERY 6 HOURS PRN
Qty: 28 TABLET | Refills: 0 | Status: ON HOLD | OUTPATIENT
Start: 2023-03-16 | End: 2023-03-23 | Stop reason: HOSPADM

## 2023-03-16 NOTE — PROGRESS NOTES
History reviewed. No pertinent past medical history.    History reviewed. No pertinent surgical history.    Current Outpatient Medications   Medication Sig    HYDROcodone-acetaminophen (NORCO) 5-325 mg per tablet Take 1 tablet by mouth every 6 (six) hours as needed for Pain.     No current facility-administered medications for this visit.       Review of patient's allergies indicates:  No Known Allergies    History reviewed. No pertinent family history.    Social History     Socioeconomic History    Marital status:        Chief Complaint:   No chief complaint on file.      History of present illness:  This is a 77-year-old female seen after a fall on November 23rd.  Patient was seen at the hospital ER and diagnosed with a comminuted right proximal humerus fracture.  Patient slipped getting out of bed landed directly onto her right shoulder.  Pain is a 8/10.  Wearing the sling.  Right elbow and hand are more painful than the shoulder.  Pain keeps her up at night.  Getting better though.  Home health PT seems to be helping.      Review of Systems:    Constitution: Negative for chills, fever, and sweats.  Negative for unexplained weight loss.    HENT:  Negative for headaches and blurry vision.    Cardiovascular:Negative for chest pain or irregular heart beat. Negative for hypertension.    Respiratory:  Negative for cough and shortness of breath.    Gastrointestinal: Negative for abdominal pain, heartburn, melena, nausea, and vomitting.    Genitourinary:  Negative bladder incontinence and dysuria.    Musculoskeletal:  See HPI    Neurological: Negative for numbness.    Psychiatric/Behavioral: Negative for depression.  The patient is not nervous/anxious.      Endocrine: Negative for polyuria    Hematologic/Lymphatic: Negative for bleeding problem.  Does not bruise/bleed easily.    Skin: Negative for poor would healing and rash      Physical Examination:    Vital Signs:    There were no vitals filed for this  visit.      There is no height or weight on file to calculate BMI.    This a well-developed, well nourished patient in no acute distress.  They are alert and oriented and cooperative to examination.  Pt. walks without an antalgic gait.      Examination of the right shoulder shows resolved bruising and still has a little swelling in the shoulder and arm.  Patient has significant limitation range of motion due to pain and guarding.  Able to wiggle her thumb in move her fingers.  Intact light touch sensation over the deltoid and in the hand.  Complains of numbness in the ulnar 3 digits.    X-rays:  X-rays of the right shoulder ordered and reviewed which show comminuted right proximal humerus fracture with minimal displacement.  overall acceptable alignment.       Assessment::  Right proximal humerus fracture       Plan:  Reviewed the findings with her and her family today.  Continue with the home PT for range of motion and strengthening.  Refilled her pain medicine.  Follow-up in 2 months with more x-rays of the right shoulder.    This note was created using M [email or web address] voice recognition software that occasionally misinterpreted phrases or words.    Consult note is delivered via Epic messaging service.

## 2023-03-21 ENCOUNTER — HOSPITAL ENCOUNTER (OUTPATIENT)
Facility: HOSPITAL | Age: 78
Discharge: HOME HEALTH | End: 2023-03-23
Attending: EMERGENCY MEDICINE | Admitting: INTERNAL MEDICINE
Payer: MEDICARE

## 2023-03-21 DIAGNOSIS — E87.1 HYPONATREMIA: ICD-10-CM

## 2023-03-21 DIAGNOSIS — R55 SYNCOPE: ICD-10-CM

## 2023-03-21 DIAGNOSIS — R07.9 CHEST PAIN: ICD-10-CM

## 2023-03-21 DIAGNOSIS — R55 SYNCOPE, UNSPECIFIED SYNCOPE TYPE: Primary | ICD-10-CM

## 2023-03-21 LAB
ALBUMIN SERPL BCP-MCNC: 4 G/DL (ref 3.5–5.2)
ALP SERPL-CCNC: 57 U/L (ref 55–135)
ALT SERPL W/O P-5'-P-CCNC: 20 U/L (ref 10–44)
ANION GAP SERPL CALC-SCNC: 11 MMOL/L (ref 8–16)
ANION GAP SERPL CALC-SCNC: 9 MMOL/L (ref 8–16)
AST SERPL-CCNC: 30 U/L (ref 10–40)
BASOPHILS # BLD AUTO: 0.03 K/UL (ref 0–0.2)
BASOPHILS NFR BLD: 0.4 % (ref 0–1.9)
BILIRUB SERPL-MCNC: 0.8 MG/DL (ref 0.1–1)
BNP SERPL-MCNC: 124 PG/ML (ref 0–99)
BUN SERPL-MCNC: 18 MG/DL (ref 8–23)
BUN SERPL-MCNC: 19 MG/DL (ref 8–23)
CALCIUM SERPL-MCNC: 9.3 MG/DL (ref 8.7–10.5)
CALCIUM SERPL-MCNC: 9.7 MG/DL (ref 8.7–10.5)
CHLORIDE SERPL-SCNC: 91 MMOL/L (ref 95–110)
CHLORIDE SERPL-SCNC: 92 MMOL/L (ref 95–110)
CO2 SERPL-SCNC: 22 MMOL/L (ref 23–29)
CO2 SERPL-SCNC: 24 MMOL/L (ref 23–29)
CREAT SERPL-MCNC: 0.6 MG/DL (ref 0.5–1.4)
CREAT SERPL-MCNC: 0.7 MG/DL (ref 0.5–1.4)
DIFFERENTIAL METHOD: ABNORMAL
EOSINOPHIL # BLD AUTO: 0.1 K/UL (ref 0–0.5)
EOSINOPHIL NFR BLD: 1 % (ref 0–8)
ERYTHROCYTE [DISTWIDTH] IN BLOOD BY AUTOMATED COUNT: 12.3 % (ref 11.5–14.5)
EST. GFR  (NO RACE VARIABLE): >60 ML/MIN/1.73 M^2
EST. GFR  (NO RACE VARIABLE): >60 ML/MIN/1.73 M^2
GLUCOSE SERPL-MCNC: 115 MG/DL (ref 70–110)
GLUCOSE SERPL-MCNC: 134 MG/DL (ref 70–110)
HCT VFR BLD AUTO: 37.7 % (ref 37–48.5)
HGB BLD-MCNC: 13.1 G/DL (ref 12–16)
IMM GRANULOCYTES # BLD AUTO: 0.03 K/UL (ref 0–0.04)
IMM GRANULOCYTES NFR BLD AUTO: 0.4 % (ref 0–0.5)
LYMPHOCYTES # BLD AUTO: 0.6 K/UL (ref 1–4.8)
LYMPHOCYTES NFR BLD: 8.7 % (ref 18–48)
MCH RBC QN AUTO: 32.2 PG (ref 27–31)
MCHC RBC AUTO-ENTMCNC: 34.7 G/DL (ref 32–36)
MCV RBC AUTO: 93 FL (ref 82–98)
MONOCYTES # BLD AUTO: 0.6 K/UL (ref 0.3–1)
MONOCYTES NFR BLD: 7.8 % (ref 4–15)
NEUTROPHILS # BLD AUTO: 5.9 K/UL (ref 1.8–7.7)
NEUTROPHILS NFR BLD: 81.7 % (ref 38–73)
NRBC BLD-RTO: 0 /100 WBC
PLATELET # BLD AUTO: 159 K/UL (ref 150–450)
PLATELET BLD QL SMEAR: ABNORMAL
PMV BLD AUTO: 9.6 FL (ref 9.2–12.9)
POTASSIUM SERPL-SCNC: 4.3 MMOL/L (ref 3.5–5.1)
POTASSIUM SERPL-SCNC: 4.3 MMOL/L (ref 3.5–5.1)
PROT SERPL-MCNC: 6.8 G/DL (ref 6–8.4)
RBC # BLD AUTO: 4.07 M/UL (ref 4–5.4)
SODIUM SERPL-SCNC: 122 MMOL/L (ref 136–145)
SODIUM SERPL-SCNC: 127 MMOL/L (ref 136–145)
TROPONIN I SERPL HS-MCNC: 20.8 PG/ML (ref 0–14.9)
WBC # BLD AUTO: 7.21 K/UL (ref 3.9–12.7)

## 2023-03-21 PROCEDURE — G0378 HOSPITAL OBSERVATION PER HR: HCPCS

## 2023-03-21 PROCEDURE — 93010 ELECTROCARDIOGRAM REPORT: CPT | Mod: ,,, | Performed by: GENERAL PRACTICE

## 2023-03-21 PROCEDURE — 25000003 PHARM REV CODE 250: Performed by: STUDENT IN AN ORGANIZED HEALTH CARE EDUCATION/TRAINING PROGRAM

## 2023-03-21 PROCEDURE — 84484 ASSAY OF TROPONIN QUANT: CPT | Performed by: EMERGENCY MEDICINE

## 2023-03-21 PROCEDURE — 25000003 PHARM REV CODE 250: Performed by: EMERGENCY MEDICINE

## 2023-03-21 PROCEDURE — 85025 COMPLETE CBC W/AUTO DIFF WBC: CPT | Performed by: EMERGENCY MEDICINE

## 2023-03-21 PROCEDURE — 63600175 PHARM REV CODE 636 W HCPCS: Performed by: INTERNAL MEDICINE

## 2023-03-21 PROCEDURE — 83880 ASSAY OF NATRIURETIC PEPTIDE: CPT | Performed by: EMERGENCY MEDICINE

## 2023-03-21 PROCEDURE — 36415 COLL VENOUS BLD VENIPUNCTURE: CPT | Performed by: INTERNAL MEDICINE

## 2023-03-21 PROCEDURE — 80048 BASIC METABOLIC PNL TOTAL CA: CPT | Mod: XB | Performed by: INTERNAL MEDICINE

## 2023-03-21 PROCEDURE — 93005 ELECTROCARDIOGRAM TRACING: CPT | Performed by: GENERAL PRACTICE

## 2023-03-21 PROCEDURE — 25000003 PHARM REV CODE 250: Performed by: INTERNAL MEDICINE

## 2023-03-21 PROCEDURE — 99285 EMERGENCY DEPT VISIT HI MDM: CPT | Mod: 25

## 2023-03-21 PROCEDURE — 96374 THER/PROPH/DIAG INJ IV PUSH: CPT

## 2023-03-21 PROCEDURE — 93010 EKG 12-LEAD: ICD-10-PCS | Mod: ,,, | Performed by: GENERAL PRACTICE

## 2023-03-21 PROCEDURE — 80053 COMPREHEN METABOLIC PANEL: CPT | Performed by: EMERGENCY MEDICINE

## 2023-03-21 RX ORDER — HYDROCODONE BITARTRATE AND ACETAMINOPHEN 5; 325 MG/1; MG/1
1 TABLET ORAL EVERY 6 HOURS PRN
Status: DISCONTINUED | OUTPATIENT
Start: 2023-03-21 | End: 2023-03-21

## 2023-03-21 RX ORDER — NALOXONE HCL 0.4 MG/ML
0.02 VIAL (ML) INJECTION
Status: DISCONTINUED | OUTPATIENT
Start: 2023-03-21 | End: 2023-03-23 | Stop reason: HOSPADM

## 2023-03-21 RX ORDER — LISINOPRIL 20 MG/1
20 TABLET ORAL DAILY
Status: DISCONTINUED | OUTPATIENT
Start: 2023-03-21 | End: 2023-03-23 | Stop reason: HOSPADM

## 2023-03-21 RX ORDER — CYCLOBENZAPRINE HCL 5 MG
5 TABLET ORAL 3 TIMES DAILY PRN
COMMUNITY
Start: 2023-01-10 | End: 2023-12-13 | Stop reason: SDUPTHER

## 2023-03-21 RX ORDER — SIMETHICONE 80 MG
1 TABLET,CHEWABLE ORAL 4 TIMES DAILY PRN
Status: DISCONTINUED | OUTPATIENT
Start: 2023-03-21 | End: 2023-03-23 | Stop reason: HOSPADM

## 2023-03-21 RX ORDER — TALC
9 POWDER (GRAM) TOPICAL NIGHTLY PRN
Status: DISCONTINUED | OUTPATIENT
Start: 2023-03-21 | End: 2023-03-23 | Stop reason: HOSPADM

## 2023-03-21 RX ORDER — ALBUTEROL SULFATE 1.25 MG/3ML
1.25 SOLUTION RESPIRATORY (INHALATION) EVERY 6 HOURS PRN
COMMUNITY
End: 2023-12-13

## 2023-03-21 RX ORDER — ACETAMINOPHEN 325 MG/1
650 TABLET ORAL EVERY 4 HOURS PRN
Status: DISCONTINUED | OUTPATIENT
Start: 2023-03-21 | End: 2023-03-23 | Stop reason: HOSPADM

## 2023-03-21 RX ORDER — SODIUM CHLORIDE 0.9 % (FLUSH) 0.9 %
10 SYRINGE (ML) INJECTION EVERY 6 HOURS PRN
Status: DISCONTINUED | OUTPATIENT
Start: 2023-03-21 | End: 2023-03-23 | Stop reason: HOSPADM

## 2023-03-21 RX ORDER — ONDANSETRON 2 MG/ML
4 INJECTION INTRAMUSCULAR; INTRAVENOUS EVERY 6 HOURS PRN
Status: DISCONTINUED | OUTPATIENT
Start: 2023-03-21 | End: 2023-03-23 | Stop reason: HOSPADM

## 2023-03-21 RX ORDER — SODIUM CHLORIDE 9 MG/ML
1000 INJECTION, SOLUTION INTRAVENOUS
Status: COMPLETED | OUTPATIENT
Start: 2023-03-21 | End: 2023-03-21

## 2023-03-21 RX ORDER — ALENDRONATE SODIUM 70 MG/1
70 TABLET ORAL
COMMUNITY
Start: 2023-01-10 | End: 2023-12-13

## 2023-03-21 RX ORDER — IBUPROFEN 200 MG
24 TABLET ORAL
Status: DISCONTINUED | OUTPATIENT
Start: 2023-03-21 | End: 2023-03-23 | Stop reason: HOSPADM

## 2023-03-21 RX ORDER — ALBUTEROL SULFATE 90 UG/1
AEROSOL, METERED RESPIRATORY (INHALATION)
COMMUNITY
Start: 2023-01-10 | End: 2023-12-13 | Stop reason: SDUPTHER

## 2023-03-21 RX ORDER — ENOXAPARIN SODIUM 100 MG/ML
40 INJECTION SUBCUTANEOUS EVERY 24 HOURS
Status: DISCONTINUED | OUTPATIENT
Start: 2023-03-21 | End: 2023-03-23 | Stop reason: HOSPADM

## 2023-03-21 RX ORDER — MAG HYDROX/ALUMINUM HYD/SIMETH 200-200-20
30 SUSPENSION, ORAL (FINAL DOSE FORM) ORAL 4 TIMES DAILY PRN
Status: DISCONTINUED | OUTPATIENT
Start: 2023-03-21 | End: 2023-03-23 | Stop reason: HOSPADM

## 2023-03-21 RX ORDER — ERGOCALCIFEROL 1.25 MG/1
50000 CAPSULE ORAL
COMMUNITY
Start: 2023-01-10 | End: 2023-12-13

## 2023-03-21 RX ORDER — OXYBUTYNIN CHLORIDE 10 MG/1
10 TABLET, EXTENDED RELEASE ORAL NIGHTLY
COMMUNITY
Start: 2022-12-02 | End: 2023-12-13

## 2023-03-21 RX ORDER — MORPHINE SULFATE 4 MG/ML
4 INJECTION, SOLUTION INTRAMUSCULAR; INTRAVENOUS EVERY 4 HOURS PRN
Status: DISCONTINUED | OUTPATIENT
Start: 2023-03-21 | End: 2023-03-23 | Stop reason: HOSPADM

## 2023-03-21 RX ORDER — OXYCODONE HYDROCHLORIDE 5 MG/1
5 TABLET ORAL EVERY 6 HOURS PRN
Status: DISCONTINUED | OUTPATIENT
Start: 2023-03-21 | End: 2023-03-23 | Stop reason: HOSPADM

## 2023-03-21 RX ORDER — ROSUVASTATIN CALCIUM 10 MG/1
10 TABLET, COATED ORAL NIGHTLY
COMMUNITY
Start: 2023-03-14 | End: 2023-12-13

## 2023-03-21 RX ORDER — LISINOPRIL AND HYDROCHLOROTHIAZIDE 20; 25 MG/1; MG/1
1 TABLET ORAL DAILY
Status: ON HOLD | COMMUNITY
Start: 2023-03-02 | End: 2023-03-23 | Stop reason: HOSPADM

## 2023-03-21 RX ORDER — ACETAMINOPHEN 500 MG
1000 TABLET ORAL EVERY 8 HOURS PRN
Status: DISCONTINUED | OUTPATIENT
Start: 2023-03-21 | End: 2023-03-23 | Stop reason: HOSPADM

## 2023-03-21 RX ORDER — ALBUTEROL SULFATE 90 UG/1
2 AEROSOL, METERED RESPIRATORY (INHALATION) EVERY 4 HOURS PRN
Status: DISCONTINUED | OUTPATIENT
Start: 2023-03-21 | End: 2023-03-21

## 2023-03-21 RX ORDER — PROCHLORPERAZINE EDISYLATE 5 MG/ML
5 INJECTION INTRAMUSCULAR; INTRAVENOUS EVERY 6 HOURS PRN
Status: DISCONTINUED | OUTPATIENT
Start: 2023-03-21 | End: 2023-03-23 | Stop reason: HOSPADM

## 2023-03-21 RX ORDER — OXYBUTYNIN CHLORIDE 10 MG/1
10 TABLET, EXTENDED RELEASE ORAL NIGHTLY
Status: DISCONTINUED | OUTPATIENT
Start: 2023-03-21 | End: 2023-03-23 | Stop reason: HOSPADM

## 2023-03-21 RX ORDER — IBUPROFEN 800 MG/1
800 TABLET ORAL EVERY 6 HOURS PRN
COMMUNITY
Start: 2022-12-09 | End: 2023-12-13

## 2023-03-21 RX ORDER — ALBUTEROL SULFATE 0.83 MG/ML
2.5 SOLUTION RESPIRATORY (INHALATION) EVERY 4 HOURS PRN
Status: DISCONTINUED | OUTPATIENT
Start: 2023-03-21 | End: 2023-03-23 | Stop reason: HOSPADM

## 2023-03-21 RX ORDER — GLUCAGON 1 MG
1 KIT INJECTION
Status: DISCONTINUED | OUTPATIENT
Start: 2023-03-21 | End: 2023-03-23 | Stop reason: HOSPADM

## 2023-03-21 RX ORDER — IBUPROFEN 200 MG
16 TABLET ORAL
Status: DISCONTINUED | OUTPATIENT
Start: 2023-03-21 | End: 2023-03-23 | Stop reason: HOSPADM

## 2023-03-21 RX ADMIN — LISINOPRIL 20 MG: 20 TABLET ORAL at 08:03

## 2023-03-21 RX ADMIN — SODIUM CHLORIDE 1000 ML: 0.9 INJECTION, SOLUTION INTRAVENOUS at 03:03

## 2023-03-21 RX ADMIN — OXYCODONE HYDROCHLORIDE 5 MG: 5 TABLET ORAL at 08:03

## 2023-03-21 RX ADMIN — OXYBUTYNIN CHLORIDE 10 MG: 10 TABLET, EXTENDED RELEASE ORAL at 08:03

## 2023-03-21 RX ADMIN — ONDANSETRON 4 MG: 2 INJECTION INTRAMUSCULAR; INTRAVENOUS at 10:03

## 2023-03-21 RX ADMIN — ACETAMINOPHEN 650 MG: 325 TABLET ORAL at 06:03

## 2023-03-21 NOTE — H&P
[facility] Medicine History & Physical Examination   Patient Name: Mirela Calderon  MRN: [id number]  Patient Class: OP- Observation   Admission Date: 3/21/2023 11:26 AM  Length of Stay: 0  Attending Physician: Matt Mclean MD  Primary Care Provider: Primary Doctor No  Face-to-Face encounter date: 03/21/2023  Code Status: Full Code  Chief Complaint: Loss of Consciousness (While playing cards, denies complaint at this time)        HISTORY OF PRESENT ILLNESS:   Mirela Calderon is a 77 y.o. White female with known history of essential hypertension presented to the emergency room for syncopal episode.  As per the patient she was playing cards with her  when she suddenly became very clammy followed by loss in consciousness for 2 minutes.  She regained consciousness however was confused for few minutes till the fire department in EMS arrived.  She had a similar episode a month ago when she was using bathroom.  She denies any other episodes.  She denies fall any new injury to the head.  She denies any other warning symptoms.  She reports decreased border intake and likes to drink beer and wine.   In the emergency room vitals and labs within normal limits.  Hospital medicine consulted for the workup of syncope.  The    REVIEW OF SYSTEMS:   10 Point Review of System was performed and was found to be negative except for that mentioned already in the HPI above.     PAST MEDICAL HISTORY:   Essential hypertension    PAST SURGICAL HISTORY:   Reviewed but not pertinent    ALLERGIES:   Reviewed but not pertinent  FAMILY HISTORY:   Reviewed but not pertinent  SOCIAL HISTORY:     Denies smoking    HOME MEDICATIONS:     Prior to Admission medications    Medication Sig Start Date End Date Taking? Authorizing Provider   albuterol (ACCUNEB) 1.25 mg/3 mL Nebu Take 1.25 mg by nebulization every 6 (six) hours as needed. Rescue   Yes Historical Provider   alendronate (FOSAMAX) 70 MG tablet Take 70 mg by  "mouth every 7 days. Saturdays 1/10/23  Yes Historical Provider   ergocalciferol (ERGOCALCIFEROL) 50,000 unit Cap Take 50,000 Units by mouth every 7 days. SATURDAYS 1/10/23  Yes Historical Provider   lisinopriL-hydrochlorothiazide (PRINZIDE,ZESTORETIC) 20-25 mg Tab Take 1 tablet by mouth once daily. 3/2/23  Yes Historical Provider   oxyCODONE-acetaminophen (PERCOCET) 5-325 mg per tablet Take 1 tablet by mouth every 6 (six) hours as needed for Pain. 3/16/23  Yes Kenny Liang MD   rosuvastatin (CRESTOR) 10 MG tablet Take 10 mg by mouth every evening. 3/14/23  Yes Historical Provider   albuterol (PROVENTIL/VENTOLIN HFA) 90 mcg/actuation inhaler Inhale into the lungs. 1/10/23   Historical Provider   cyclobenzaprine (FLEXERIL) 5 MG tablet Take 5 mg by mouth 3 (three) times daily as needed. 1/10/23   Historical Provider   HYDROcodone-acetaminophen (NORCO) 5-325 mg per tablet Take 1 tablet by mouth every 6 (six) hours as needed for Pain. 2/2/23   Kenny Liang MD   ibuprofen (ADVIL,MOTRIN) 800 MG tablet Take 800 mg by mouth every 6 (six) hours as needed. 12/9/22   Historical Provider   oxybutynin (DITROPAN-XL) 10 MG 24 hr tablet Take 10 mg by mouth every evening. 12/2/22   Historical Provider         PHYSICAL EXAM:   BP (!) 159/70   Pulse 97   Temp 97.9 °F (36.6 °C) (Oral)   Resp 19   Ht 5' 5" (1.651 m)   Wt 70.8 kg (156 lb)   SpO2 100%   BMI 25.96 kg/m²   Vitals Reviewed  General appearance: non-toxic and not acutely ill-appearingWhite female in no apparent distress.  Skin: No Rash.   Neuro: Motor and sensory exams grossly intact. Good tone. Power in all 4 extremities 5/5.   HENT: Atraumatic head. Moist mucous membranes of oral cavity.  Eyes: Normal extraocular movements.   Neck: Supple. No evidence of lymphadenopathy. No thyroidomegaly.  Lungs: Clear to auscultation bilaterally. No wheezing present.   Heart: Regular rate and rhythm. S1 and S2 present with no murmurs/gallop/rub. No pedal edema. No " JVD present.   Abdomen: Soft, non-distended, non-tender. No rebound tenderness/guarding. No masses or organomegaly. Bowel sounds are normal. Bladder is not palpable.   Extremities: No cyanosis, clubbing.  Psych/mental status: Alert and oriented. Cooperative. Responds appropriately to questions.   EMERGENCY DEPARTMENT LABS AND IMAGING:     Labs Reviewed   CBC W/ AUTO DIFFERENTIAL - Abnormal; Notable for the following components:       Result Value    MCH 32.2 (*)     Lymph # 0.6 (*)     Gran % 81.7 (*)     Lymph % 8.7 (*)     All other components within normal limits   COMPREHENSIVE METABOLIC PANEL - Abnormal; Notable for the following components:    Sodium 122 (*)     Chloride 91 (*)     CO2 22 (*)     Glucose 134 (*)     All other components within normal limits    Narrative:     Sodium critical result(s) repeated. Called and verbal readback   obtained from [facility], RN. by SLT1 03/21/2023 14:25   TROPONIN I HIGH SENSITIVITY - Abnormal; Notable for the following components:    Troponin I High Sensitivity 20.8 (*)     All other components within normal limits   B-TYPE NATRIURETIC PEPTIDE - Abnormal; Notable for the following components:     (*)     All other components within normal limits       X-Ray Chest AP Portable   Final Result      US Carotid Bilateral    (Results Pending)       ASSESSMENT & PLAN:   Mirela Calderon is a 77 y.o. female admitted for    Active Hospital Problems    Diagnosis    *Syncope    Hyponatremia      Plan  Admit to observation unit   Cardiac monitoring   Orthostatic vital thinks   Echocardiogram   Carotid ultrasound  Cardiology consultation  Hyponatremia likely secondary to thiazide use, likely chronic, will treat with IV fluids    Diet: Cardiac    DVT Prophylaxis: low molecular weight heparin and Encourage ambulation. OOB as tolerated.     Discharge Planning and Disposition:  Home with assistance from family    Expected LOS: 1-2 days        ________________________________________________________________________________    Face-to-Face encounter date: 03/21/2023  Encounter included review of the medical records, interviewing and examining the patient face-to-face, discussion with family and other health care providers including emergency medicine physician, admission orders, interpreting lab/test results and formulating a plan of care.   Medical Decision Making during this encounter was High Complexity due to syncope and hyponatremia  ________________________________________________________________________________    INPATIENT LIST OF MEDICATIONS   No current facility-administered medications for this encounter.    Current Outpatient Medications:     albuterol (ACCUNEB) 1.25 mg/3 mL Nebu, Take 1.25 mg by nebulization every 6 (six) hours as needed. Rescue, Disp: , Rfl:     alendronate (FOSAMAX) 70 MG tablet, Take 70 mg by mouth every 7 days. Saturdays, Disp: , Rfl:     ergocalciferol (ERGOCALCIFEROL) 50,000 unit Cap, Take 50,000 Units by mouth every 7 days. SATURDAYS, Disp: , Rfl:     lisinopriL-hydrochlorothiazide (PRINZIDE,ZESTORETIC) 20-25 mg Tab, Take 1 tablet by mouth once daily., Disp: , Rfl:     oxyCODONE-acetaminophen (PERCOCET) 5-325 mg per tablet, Take 1 tablet by mouth every 6 (six) hours as needed for Pain., Disp: 28 tablet, Rfl: 0    rosuvastatin (CRESTOR) 10 MG tablet, Take 10 mg by mouth every evening., Disp: , Rfl:     albuterol (PROVENTIL/VENTOLIN HFA) 90 mcg/actuation inhaler, Inhale into the lungs., Disp: , Rfl:     cyclobenzaprine (FLEXERIL) 5 MG tablet, Take 5 mg by mouth 3 (three) times daily as needed., Disp: , Rfl:     HYDROcodone-acetaminophen (NORCO) 5-325 mg per tablet, Take 1 tablet by mouth every 6 (six) hours as needed for Pain., Disp: 28 tablet, Rfl: 0    ibuprofen (ADVIL,MOTRIN) 800 MG tablet, Take 800 mg by mouth every 6 (six) hours as needed., Disp: , Rfl:     oxybutynin (DITROPAN-XL) 10 MG 24 hr tablet, Take 10 mg by  mouth every evening., Disp: , Rfl:       Scheduled Meds:  Continuous Infusions:  PRN Meds:.      Matt Mclean  [facility] Hospitalist  03/21/2023

## 2023-03-21 NOTE — ED PROVIDER NOTES
Encounter Date: 3/21/2023       History     Chief Complaint   Patient presents with    Loss of Consciousness     While playing cards, denies complaint at this time     77-year-old female presented emergency department after a syncopal episode.  Patient was sitting with her  and playing cards and then passed out and was unresponsive and woke up with fire department next to her.  Patient had a similar episode when she passed out about a week ago.  Patient currently denies any complaints.  Currently denies fever or chills or nausea vomiting or chest pain or shortness of breath or abdominal pain or weakness or numbness.  Patient said when she woke up she was not confused and was back at baseline.    Review of patient's allergies indicates:  No Known Allergies  No past medical history on file.  No past surgical history on file.  No family history on file.     Review of Systems   Constitutional: Negative.    HENT: Negative.     Eyes: Negative.    Respiratory: Negative.     Cardiovascular: Negative.  Negative for chest pain.   Gastrointestinal: Negative.    Endocrine: Negative.    Genitourinary: Negative.    Musculoskeletal: Negative.    Skin: Negative.    Allergic/Immunologic: Negative.    Neurological:  Positive for syncope.   Hematological: Negative.    Psychiatric/Behavioral: Negative.     All other systems reviewed and are negative.    Physical Exam     Initial Vitals [03/21/23 1133]   BP Pulse Resp Temp SpO2   (!) 166/73 85 18 97.9 °F (36.6 °C) 100 %      MAP       --         Physical Exam    Nursing note and vitals reviewed.  Constitutional: She appears well-developed and well-nourished.   HENT:   Head: Normocephalic and atraumatic.   Nose: Nose normal.   Mouth/Throat: Oropharynx is clear and moist.   Eyes: Conjunctivae and EOM are normal. Pupils are equal, round, and reactive to light.   Neck: Neck supple. No thyromegaly present. No tracheal deviation present. No JVD present.   Normal range of  motion.  Cardiovascular:  Normal rate, regular rhythm, normal heart sounds and intact distal pulses.           No murmur heard.  Pulmonary/Chest: Breath sounds normal. No stridor. No respiratory distress. She has no wheezes. She has no rales.   Abdominal: Abdomen is soft. Bowel sounds are normal. There is no abdominal tenderness.   Musculoskeletal:         General: No edema. Normal range of motion.      Cervical back: Normal range of motion and neck supple.     Neurological: She is alert and oriented to person, place, and time. She has normal strength. She displays normal reflexes. No cranial nerve deficit or sensory deficit. GCS score is 15. GCS eye subscore is 4. GCS verbal subscore is 5. GCS motor subscore is 6.   Skin: Skin is warm. Capillary refill takes less than 2 seconds.   Psychiatric: She has a normal mood and affect. Thought content normal.       ED Course   Procedures  Labs Reviewed   CBC W/ AUTO DIFFERENTIAL - Abnormal; Notable for the following components:       Result Value    MCH 32.2 (*)     Lymph # 0.6 (*)     Gran % 81.7 (*)     Lymph % 8.7 (*)     All other components within normal limits   COMPREHENSIVE METABOLIC PANEL - Abnormal; Notable for the following components:    Sodium 122 (*)     Chloride 91 (*)     CO2 22 (*)     Glucose 134 (*)     All other components within normal limits    Narrative:     Sodium critical result(s) repeated. Called and verbal readback   obtained from [facility], RN. by SLT1 03/21/2023 14:25   TROPONIN I HIGH SENSITIVITY - Abnormal; Notable for the following components:    Troponin I High Sensitivity 20.8 (*)     All other components within normal limits   B-TYPE NATRIURETIC PEPTIDE - Abnormal; Notable for the following components:     (*)     All other components within normal limits     EKG Readings: (Independently Interpreted)   Initial Reading: No STEMI. Rhythm: Normal Sinus Rhythm. Ectopy: No Ectopy. Conduction: Normal.   ECG Results               EKG 12-lead (In process)  Result time 03/21/23 12:00:54      In process by Interface, Lab In [facility] (03/21/23 12:00:54)                   Narrative:    Test Reason : R06.02,    Vent. Rate : 084 BPM     Atrial Rate : 084 BPM     P-R Int : 182 ms          QRS Dur : 100 ms      QT Int : 388 ms       P-R-T Axes : 077 075 079 degrees     QTc Int : 458 ms    Sinus rhythm with Premature supraventricular complexes  Possible Left atrial enlargement  Possible Anterior infarct (cited on or before 21-MAR-2023)  T wave abnormality, consider inferolateral ischemia  Abnormal ECG  When compared with ECG of 21-MAR-2023 11:56,  T wave inversion less evident in Lateral leads    Referred By:             Confirmed By:                                   Imaging Results              X-Ray Chest AP Portable (Final result)  Result time 03/21/23 12:15:31      Final result by Lorenzo Frey Jr., MD (03/21/23 12:15:31)                   Narrative:    HISTORY: CHF    COMPARISON:None available.    FINDINGS:Cardiomediastinal silhouette is normal in size. Lungs are well expanded and free of active disease. No evidence of pulmonic infiltrate, pleural effusion, or atelectasis. Large sliding-type hiatal hernia is established evidence of air density focus projecting in the posterior mediastinum migrating towards the left of midline. Diffuse interstitial pulmonary scarring. Osseous structures reveal a moderate dextroconvex alignment of the thoracic spine.    IMPRESSION:  1. No evidence of acute cardiopulmonary process.  2. Large sliding type hiatus hernia.    Electronically signed by:  Lorenzo Frey MD  3/21/2023 12:15 PM CDT Workstation: [id number]                                     Medications   0.9%  NaCl infusion (1,000 mLs Intravenous New Bag 3/21/23 8141)     Medical Decision Making:   Differential Diagnosis:   77-year-old female presented emergency department after a syncopal episode.  Patient had a similar syncopal episode about 1 week  ago.  Patient currently is asymptomatic.  Patient noted to be hyponatremic as well.  Patient had 2 episodes of syncope.  Patient otherwise nontoxic.  Hemodynamically stable.  Given episodes of syncope will need cardiac monitoring.  Hyponatremia also need to be monitored.  Hospital Medicine consulted for evaluation for admission and further management.  Screening workup otherwise unremarkable other than hyponatremia.  Neurologically intact at this time.  Independently Interpreted Test(s):   I have ordered and independently interpreted X-rays - see prior notes.  I have ordered and independently interpreted EKG Reading(s) - see prior notes  Clinical Tests:   Lab Tests: Reviewed  Radiological Study: Reviewed  Medical Tests: Reviewed  Other:   I have discussed this case with another health care provider.                        Clinical Impression:   Final diagnoses:  [R55] Syncope, unspecified syncope type (Primary)  [E87.1] Hyponatremia        ED Disposition Condition    Admit Fair                Marcos Tadeo MD  03/21/23 [id number]

## 2023-03-22 ENCOUNTER — CLINICAL SUPPORT (OUTPATIENT)
Dept: CARDIOLOGY | Facility: HOSPITAL | Age: 78
End: 2023-03-22
Attending: INTERNAL MEDICINE
Payer: MEDICARE

## 2023-03-22 VITALS — HEIGHT: 65 IN | BODY MASS INDEX: 25.66 KG/M2 | WEIGHT: 154 LBS

## 2023-03-22 LAB
ANION GAP SERPL CALC-SCNC: 5 MMOL/L (ref 8–16)
ANION GAP SERPL CALC-SCNC: 5 MMOL/L (ref 8–16)
ANION GAP SERPL CALC-SCNC: 6 MMOL/L (ref 8–16)
ANION GAP SERPL CALC-SCNC: 6 MMOL/L (ref 8–16)
AORTIC ROOT ANNULUS: 3.4 CM
AORTIC VALVE CUSP SEPERATION: 2.1 CM
AV INDEX (PROSTH): 0.89
AV MEAN GRADIENT: 3 MMHG
AV PEAK GRADIENT: 6 MMHG
AV VALVE AREA: 2.53 CM2
AV VELOCITY RATIO: 0.91
BASOPHILS # BLD AUTO: 0.03 K/UL (ref 0–0.2)
BASOPHILS NFR BLD: 0.5 % (ref 0–1.9)
BSA FOR ECHO PROCEDURE: 1.79 M2
BUN SERPL-MCNC: 13 MG/DL (ref 8–23)
BUN SERPL-MCNC: 14 MG/DL (ref 8–23)
BUN SERPL-MCNC: 16 MG/DL (ref 8–23)
BUN SERPL-MCNC: 19 MG/DL (ref 8–23)
CALCIUM SERPL-MCNC: 9 MG/DL (ref 8.7–10.5)
CALCIUM SERPL-MCNC: 9.3 MG/DL (ref 8.7–10.5)
CALCIUM SERPL-MCNC: 9.4 MG/DL (ref 8.7–10.5)
CALCIUM SERPL-MCNC: 9.7 MG/DL (ref 8.7–10.5)
CHLORIDE SERPL-SCNC: 95 MMOL/L (ref 95–110)
CHLORIDE SERPL-SCNC: 96 MMOL/L (ref 95–110)
CHLORIDE SERPL-SCNC: 97 MMOL/L (ref 95–110)
CHLORIDE SERPL-SCNC: 97 MMOL/L (ref 95–110)
CO2 SERPL-SCNC: 24 MMOL/L (ref 23–29)
CREAT SERPL-MCNC: 0.6 MG/DL (ref 0.5–1.4)
CV ECHO LV RWT: 0.38 CM
DIFFERENTIAL METHOD: ABNORMAL
DOP CALC AO PEAK VEL: 1.23 M/S
DOP CALC AO VTI: 25.9 CM
DOP CALC LVOT AREA: 2.8 CM2
DOP CALC LVOT DIAMETER: 1.9 CM
DOP CALC LVOT PEAK VEL: 1.12 M/S
DOP CALC LVOT STROKE VOLUME: 65.46 CM3
DOP CALC MV VTI: 33 CM
DOP CALCLVOT PEAK VEL VTI: 23.1 CM
E WAVE DECELERATION TIME: 190 MSEC
E/A RATIO: 0.7
E/E' RATIO: 12 M/S
ECHO LV POSTERIOR WALL: 0.95 CM (ref 0.6–1.1)
EJECTION FRACTION: 50 %
EOSINOPHIL # BLD AUTO: 0.1 K/UL (ref 0–0.5)
EOSINOPHIL NFR BLD: 2.2 % (ref 0–8)
ERYTHROCYTE [DISTWIDTH] IN BLOOD BY AUTOMATED COUNT: 12.3 % (ref 11.5–14.5)
EST. GFR  (NO RACE VARIABLE): >60 ML/MIN/1.73 M^2
FRACTIONAL SHORTENING: 33 % (ref 28–44)
GLUCOSE SERPL-MCNC: 110 MG/DL (ref 70–110)
GLUCOSE SERPL-MCNC: 119 MG/DL (ref 70–110)
GLUCOSE SERPL-MCNC: 93 MG/DL (ref 70–110)
GLUCOSE SERPL-MCNC: 98 MG/DL (ref 70–110)
HCT VFR BLD AUTO: 32.5 % (ref 37–48.5)
HGB BLD-MCNC: 11.2 G/DL (ref 12–16)
IMM GRANULOCYTES # BLD AUTO: 0.02 K/UL (ref 0–0.04)
IMM GRANULOCYTES NFR BLD AUTO: 0.3 % (ref 0–0.5)
INTERVENTRICULAR SEPTUM: 1 CM (ref 0.6–1.1)
IVC DIAMETER: 1.39 CM
LEFT ATRIUM VOLUME INDEX MOD: 44.2 ML/M2
LEFT ATRIUM VOLUME MOD: 78.2 CM3
LEFT INTERNAL DIMENSION IN SYSTOLE: 3.36 CM (ref 2.1–4)
LEFT VENTRICLE DIASTOLIC VOLUME INDEX: 66.1 ML/M2
LEFT VENTRICLE DIASTOLIC VOLUME: 117 ML
LEFT VENTRICLE MASS INDEX: 99 G/M2
LEFT VENTRICLE SYSTOLIC VOLUME INDEX: 26 ML/M2
LEFT VENTRICLE SYSTOLIC VOLUME: 46.1 ML
LEFT VENTRICULAR INTERNAL DIMENSION IN DIASTOLE: 4.98 CM (ref 3.5–6)
LEFT VENTRICULAR MASS: 174.74 G
LV LATERAL E/E' RATIO: 10.36 M/S
LV SEPTAL E/E' RATIO: 14.25 M/S
LVOT MG: 3 MMHG
LVOT MV: 0.76 CM/S
LYMPHOCYTES # BLD AUTO: 1.2 K/UL (ref 1–4.8)
LYMPHOCYTES NFR BLD: 19.2 % (ref 18–48)
MAGNESIUM SERPL-MCNC: 1.6 MG/DL (ref 1.6–2.6)
MCH RBC QN AUTO: 32 PG (ref 27–31)
MCHC RBC AUTO-ENTMCNC: 34.5 G/DL (ref 32–36)
MCV RBC AUTO: 93 FL (ref 82–98)
MONOCYTES # BLD AUTO: 0.6 K/UL (ref 0.3–1)
MONOCYTES NFR BLD: 10 % (ref 4–15)
MV MEAN GRADIENT: 7 MMHG
MV PEAK A VEL: 1.64 M/S
MV PEAK E VEL: 1.14 M/S
MV PEAK GRADIENT: 12 MMHG
MV STENOSIS PRESSURE HALF TIME: 55 MS
MV VALVE AREA BY CONTINUITY EQUATION: 1.98 CM2
MV VALVE AREA P 1/2 METHOD: 4 CM2
NEUTROPHILS # BLD AUTO: 4.4 K/UL (ref 1.8–7.7)
NEUTROPHILS NFR BLD: 67.8 % (ref 38–73)
NRBC BLD-RTO: 0 /100 WBC
PISA TR MAX VEL: 2.81 M/S
PLATELET # BLD AUTO: 205 K/UL (ref 150–450)
PMV BLD AUTO: 9.2 FL (ref 9.2–12.9)
POTASSIUM SERPL-SCNC: 4.2 MMOL/L (ref 3.5–5.1)
POTASSIUM SERPL-SCNC: 4.5 MMOL/L (ref 3.5–5.1)
PV MV: 0.77 M/S
PV PEAK VELOCITY: 1.14 CM/S
RBC # BLD AUTO: 3.5 M/UL (ref 4–5.4)
RV TISSUE DOPPLER FREE WALL SYSTOLIC VELOCITY 1 (APICAL 4 CHAMBER VIEW): 0.02 CM/S
SODIUM SERPL-SCNC: 125 MMOL/L (ref 136–145)
SODIUM SERPL-SCNC: 125 MMOL/L (ref 136–145)
SODIUM SERPL-SCNC: 126 MMOL/L (ref 136–145)
SODIUM SERPL-SCNC: 127 MMOL/L (ref 136–145)
TDI LATERAL: 0.11 M/S
TDI SEPTAL: 0.08 M/S
TDI: 0.1 M/S
TR MAX PG: 32 MMHG
TRICUSPID ANNULAR PLANE SYSTOLIC EXCURSION: 2.52 CM
WBC # BLD AUTO: 6.42 K/UL (ref 3.9–12.7)

## 2023-03-22 PROCEDURE — 80048 BASIC METABOLIC PNL TOTAL CA: CPT | Mod: XB | Performed by: STUDENT IN AN ORGANIZED HEALTH CARE EDUCATION/TRAINING PROGRAM

## 2023-03-22 PROCEDURE — 93306 TTE W/DOPPLER COMPLETE: CPT

## 2023-03-22 PROCEDURE — 36415 COLL VENOUS BLD VENIPUNCTURE: CPT | Performed by: STUDENT IN AN ORGANIZED HEALTH CARE EDUCATION/TRAINING PROGRAM

## 2023-03-22 PROCEDURE — 93306 TTE W/DOPPLER COMPLETE: CPT | Mod: 26,,, | Performed by: GENERAL PRACTICE

## 2023-03-22 PROCEDURE — 93306 ECHO (CUPID ONLY): ICD-10-PCS | Mod: 26,,, | Performed by: GENERAL PRACTICE

## 2023-03-22 PROCEDURE — 83735 ASSAY OF MAGNESIUM: CPT | Performed by: INTERNAL MEDICINE

## 2023-03-22 PROCEDURE — G0378 HOSPITAL OBSERVATION PER HR: HCPCS

## 2023-03-22 PROCEDURE — 85025 COMPLETE CBC W/AUTO DIFF WBC: CPT | Performed by: INTERNAL MEDICINE

## 2023-03-22 PROCEDURE — 25000003 PHARM REV CODE 250: Performed by: STUDENT IN AN ORGANIZED HEALTH CARE EDUCATION/TRAINING PROGRAM

## 2023-03-22 RX ADMIN — OXYBUTYNIN CHLORIDE 10 MG: 10 TABLET, EXTENDED RELEASE ORAL at 09:03

## 2023-03-22 RX ADMIN — LISINOPRIL 20 MG: 20 TABLET ORAL at 08:03

## 2023-03-22 RX ADMIN — OXYCODONE HYDROCHLORIDE 5 MG: 5 TABLET ORAL at 09:03

## 2023-03-22 NOTE — PLAN OF CARE
Problem: Adult Inpatient Plan of Care  Goal: Plan of Care Review  Outcome: Ongoing, Progressing  Goal: Patient-Specific Goal (Individualized)  Outcome: Ongoing, Progressing  Goal: Absence of Hospital-Acquired Illness or Injury  Outcome: Ongoing, Progressing  Goal: Optimal Comfort and Wellbeing  Outcome: Ongoing, Progressing  Goal: Readiness for Transition of Care  Outcome: Ongoing, Progressing     Problem: Fall Injury Risk  Goal: Absence of Fall and Fall-Related Injury  Outcome: Ongoing, Progressing     Problem: Syncope  Goal: Absence of Syncopal Symptoms  Outcome: Ongoing, Progressing

## 2023-03-22 NOTE — PLAN OF CARE
03/22/23 1005   KUMAR Message   Medicare Outpatient and Observation Notification regarding financial responsibility Explained to patient/caregiver;Signed/date by patient/caregiver   Date KUMAR was signed 03/22/23   Time KUMAR was signed 1009

## 2023-03-22 NOTE — PLAN OF CARE
[facility]  Initial Discharge Assessment       Primary Care Provider: Martín Sy MD    Admission Diagnosis: Syncope, unspecified syncope type [R55]    Admission Date: 3/21/2023  Expected Discharge Date: 3/22/2023    Met with pt at bedside to complete discharge assessment, verified PCP, pharmacy and information on facesheet.  No dialysis or coumadin.  See DME below.  Pt has [facility] and a private caregiver 2x/wk, 4 hrs per visit.  Pt reported that she drinks 6 beers and 1 glass of wine, daily.  Spouse will drive pt home.  Pt will resume HH upon discharge.    Discharge Barriers Identified: None    Payor: [employer] MANAGED MEDICARE / Plan: [employer] MEDICARE PLAN PPO / Product Type: Medicare Advantage /     Extended Emergency Contact Information  Primary Emergency Contact: darius parker  Mobile Phone: [phone]  Relation: Spouse   needed? No    Discharge Plan A: Home Health  Discharge Plan B: Home Health      [place], [place] - [address] [address]  [address] [address] [address]  Phone: [phone] Fax: [phone]      Initial Assessment (most recent)       Adult Discharge Assessment - 03/22/23 1113          Discharge Assessment    Assessment Type Discharge Planning Assessment     Confirmed/corrected address, phone number and insurance Yes     Confirmed Demographics Correct on Facesheet     Source of Information patient     Does patient/caregiver understand observation status Yes     Communicated CLAY with patient/caregiver No     People in Home spouse     Do you expect to return to your current living situation? Yes     Prior to hospitilization cognitive status: Alert/Oriented     Current cognitive status: Alert/Oriented     Equipment Currently Used at Home grab bar;shower chair   over toilet seat w/ arms    Readmission within 30 days? No     Patient currently being followed by outpatient case management? No     Do you currently have service(s) that help you  manage your care at home? Yes     Name and Contact number of agency [facility] and private pay caregiver, 2x/wk, 4 hrs per visit     Is the pt/caregiver preference to resume services with current agency Yes     Do you take prescription medications? Yes     Do you have prescription coverage? Yes     Coverage [employer]     Do you have any problems affording any of your prescribed medications? No     Is the patient taking medications as prescribed? yes     Who is going to help you get home at discharge? spouse     How do you get to doctors appointments? family or friend will provide     Are you on dialysis? No     Do you take coumadin? No     Discharge Plan A Home Health     Discharge Plan B Home Health     DME Needed Upon Discharge  none     Discharge Plan discussed with: Patient     Discharge Barriers Identified None        Physical Activity    On average, how many days per week do you engage in moderate to strenuous exercise (like a brisk walk)? 0 days     On average, how many minutes do you engage in exercise at this level? 0 min        Financial Resource Strain    How hard is it for you to pay for the very basics like food, housing, medical care, and heating? Not hard at all        Housing Stability    In the last 12 months, was there a time when you were not able to pay the mortgage or rent on time? No     In the last 12 months, was there a time when you did not have a steady place to sleep or slept in a shelter (including now)? No        Transportation Needs    In the past 12 months, has lack of transportation kept you from medical appointments or from getting medications? No     In the past 12 months, has lack of transportation kept you from meetings, work, or from getting things needed for daily living? No        Food Insecurity    Within the past 12 months, you worried that your food would run out before you got the money to buy more. Never true     Within the past 12 months, the food you bought just didn't  last and you didn't have money to get more. Never true        Social Connections    In a typical week, how many times do you talk on the phone with family, friends, or neighbors? Three times a week     How often do you get together with friends or relatives? --   rarely    How often do you attend Tenriism or Mormonism services? Never     Do you belong to any clubs or organizations such as Tenriism groups, unions, fraternal or athletic groups, or school groups? Yes     How often do you attend meetings of the clubs or organizations you belong to? More than 4 times per year        Alcohol Use    Q1: How often do you have a drink containing alcohol? 4 or more times a week     Q2: How many drinks containing alcohol do you have on a typical day when you are drinking? 5 or 6     Q3: How often do you have six or more drinks on one occasion? Daily or almost daily

## 2023-03-23 VITALS
DIASTOLIC BLOOD PRESSURE: 65 MMHG | HEART RATE: 85 BPM | HEIGHT: 66 IN | SYSTOLIC BLOOD PRESSURE: 140 MMHG | WEIGHT: 154.56 LBS | OXYGEN SATURATION: 95 % | TEMPERATURE: 98 F | RESPIRATION RATE: 20 BRPM | BODY MASS INDEX: 24.84 KG/M2

## 2023-03-23 PROBLEM — R55 SYNCOPE: Status: RESOLVED | Noted: 2023-03-21 | Resolved: 2023-03-23

## 2023-03-23 PROBLEM — E87.1 HYPONATREMIA: Status: RESOLVED | Noted: 2023-03-21 | Resolved: 2023-03-23

## 2023-03-23 LAB
ANION GAP SERPL CALC-SCNC: 12 MMOL/L (ref 8–16)
ANION GAP SERPL CALC-SCNC: 7 MMOL/L (ref 8–16)
ANION GAP SERPL CALC-SCNC: 8 MMOL/L (ref 8–16)
BASOPHILS # BLD AUTO: 0.02 K/UL (ref 0–0.2)
BASOPHILS NFR BLD: 0.4 % (ref 0–1.9)
BUN SERPL-MCNC: 12 MG/DL (ref 8–23)
BUN SERPL-MCNC: 12 MG/DL (ref 8–23)
BUN SERPL-MCNC: 13 MG/DL (ref 8–23)
CALCIUM SERPL-MCNC: 9.1 MG/DL (ref 8.7–10.5)
CALCIUM SERPL-MCNC: 9.4 MG/DL (ref 8.7–10.5)
CALCIUM SERPL-MCNC: 9.9 MG/DL (ref 8.7–10.5)
CHLORIDE SERPL-SCNC: 95 MMOL/L (ref 95–110)
CHLORIDE SERPL-SCNC: 97 MMOL/L (ref 95–110)
CHLORIDE SERPL-SCNC: 97 MMOL/L (ref 95–110)
CO2 SERPL-SCNC: 22 MMOL/L (ref 23–29)
CO2 SERPL-SCNC: 23 MMOL/L (ref 23–29)
CO2 SERPL-SCNC: 23 MMOL/L (ref 23–29)
CREAT SERPL-MCNC: 0.5 MG/DL (ref 0.5–1.4)
DIFFERENTIAL METHOD: ABNORMAL
EOSINOPHIL # BLD AUTO: 0.2 K/UL (ref 0–0.5)
EOSINOPHIL NFR BLD: 4.4 % (ref 0–8)
ERYTHROCYTE [DISTWIDTH] IN BLOOD BY AUTOMATED COUNT: 12.4 % (ref 11.5–14.5)
EST. GFR  (NO RACE VARIABLE): >60 ML/MIN/1.73 M^2
GLUCOSE SERPL-MCNC: 100 MG/DL (ref 70–110)
GLUCOSE SERPL-MCNC: 102 MG/DL (ref 70–110)
GLUCOSE SERPL-MCNC: 99 MG/DL (ref 70–110)
HCT VFR BLD AUTO: 34.4 % (ref 37–48.5)
HGB BLD-MCNC: 11.9 G/DL (ref 12–16)
IMM GRANULOCYTES # BLD AUTO: 0.01 K/UL (ref 0–0.04)
IMM GRANULOCYTES NFR BLD AUTO: 0.2 % (ref 0–0.5)
LYMPHOCYTES # BLD AUTO: 1.6 K/UL (ref 1–4.8)
LYMPHOCYTES NFR BLD: 32.2 % (ref 18–48)
MAGNESIUM SERPL-MCNC: 1.8 MG/DL (ref 1.6–2.6)
MCH RBC QN AUTO: 32.2 PG (ref 27–31)
MCHC RBC AUTO-ENTMCNC: 34.6 G/DL (ref 32–36)
MCV RBC AUTO: 93 FL (ref 82–98)
MONOCYTES # BLD AUTO: 0.6 K/UL (ref 0.3–1)
MONOCYTES NFR BLD: 12.3 % (ref 4–15)
NEUTROPHILS # BLD AUTO: 2.5 K/UL (ref 1.8–7.7)
NEUTROPHILS NFR BLD: 50.5 % (ref 38–73)
NRBC BLD-RTO: 0 /100 WBC
PLATELET # BLD AUTO: 200 K/UL (ref 150–450)
PMV BLD AUTO: 8.6 FL (ref 9.2–12.9)
POTASSIUM SERPL-SCNC: 4 MMOL/L (ref 3.5–5.1)
POTASSIUM SERPL-SCNC: 4.2 MMOL/L (ref 3.5–5.1)
POTASSIUM SERPL-SCNC: 4.5 MMOL/L (ref 3.5–5.1)
RBC # BLD AUTO: 3.69 M/UL (ref 4–5.4)
SODIUM SERPL-SCNC: 127 MMOL/L (ref 136–145)
SODIUM SERPL-SCNC: 128 MMOL/L (ref 136–145)
SODIUM SERPL-SCNC: 129 MMOL/L (ref 136–145)
WBC # BLD AUTO: 5.03 K/UL (ref 3.9–12.7)

## 2023-03-23 PROCEDURE — 99900031 HC PATIENT EDUCATION (STAT)

## 2023-03-23 PROCEDURE — 94799 UNLISTED PULMONARY SVC/PX: CPT

## 2023-03-23 PROCEDURE — 25000003 PHARM REV CODE 250: Performed by: STUDENT IN AN ORGANIZED HEALTH CARE EDUCATION/TRAINING PROGRAM

## 2023-03-23 PROCEDURE — 36415 COLL VENOUS BLD VENIPUNCTURE: CPT | Performed by: INTERNAL MEDICINE

## 2023-03-23 PROCEDURE — 83735 ASSAY OF MAGNESIUM: CPT | Performed by: INTERNAL MEDICINE

## 2023-03-23 PROCEDURE — 85025 COMPLETE CBC W/AUTO DIFF WBC: CPT | Performed by: INTERNAL MEDICINE

## 2023-03-23 PROCEDURE — 80048 BASIC METABOLIC PNL TOTAL CA: CPT | Performed by: STUDENT IN AN ORGANIZED HEALTH CARE EDUCATION/TRAINING PROGRAM

## 2023-03-23 PROCEDURE — 36415 COLL VENOUS BLD VENIPUNCTURE: CPT | Performed by: STUDENT IN AN ORGANIZED HEALTH CARE EDUCATION/TRAINING PROGRAM

## 2023-03-23 PROCEDURE — 94761 N-INVAS EAR/PLS OXIMETRY MLT: CPT

## 2023-03-23 PROCEDURE — 95819 EEG AWAKE AND ASLEEP: CPT

## 2023-03-23 PROCEDURE — G0378 HOSPITAL OBSERVATION PER HR: HCPCS

## 2023-03-23 PROCEDURE — 80048 BASIC METABOLIC PNL TOTAL CA: CPT | Mod: 91,XB | Performed by: STUDENT IN AN ORGANIZED HEALTH CARE EDUCATION/TRAINING PROGRAM

## 2023-03-23 PROCEDURE — 99900035 HC TECH TIME PER 15 MIN (STAT)

## 2023-03-23 RX ORDER — LISINOPRIL 20 MG/1
20 TABLET ORAL DAILY
Qty: 30 TABLET | Refills: 1 | Status: SHIPPED | OUTPATIENT
Start: 2023-03-24 | End: 2023-12-13

## 2023-03-23 RX ADMIN — LISINOPRIL 20 MG: 20 TABLET ORAL at 09:03

## 2023-03-23 RX ADMIN — OXYCODONE HYDROCHLORIDE 5 MG: 5 TABLET ORAL at 06:03

## 2023-03-23 RX ADMIN — OXYCODONE HYDROCHLORIDE 5 MG: 5 TABLET ORAL at 02:03

## 2023-03-23 NOTE — ASSESSMENT & PLAN NOTE
Admitted with syncope/LOC for about 2 minutes  Pt had exact same incident 6 months ago  Awaiting ECHO  CT Brain WNL  Pt c/o drooling of saliva all day yesterday  EKG showed PVC s  Consult Neurology/Cardiology  USS Carotid results noted

## 2023-03-23 NOTE — CONSULTS
Formerly [facility], later [facility]  Department of Neurology  Neurology Consultation Note        PATIENT NAME: Mirela Calderon  MRN: [id number]  CSN: [id number]      TODAY'S DATE: 03/23/2023  ADMIT DATE: 3/21/2023                            CONSULTING PROVIDER: Javier Reich MD  CONSULT REQUESTED BY: Pedro Luis Jamison MD      Reason for consult: Syncope       History obtained from chart review and the patient.    HPI per EMR: Mirela Calderon is a 77 y.o. female with a history of hypertension presented to the emergency room for syncopal episode.  As per the patient she was playing cards with her  when she suddenly became very clammy followed by loss in consciousness for 2 minutes.  She regained consciousness however was confused for few minutes till the fire department in EMS arrived.  She had a similar episode a month ago when she was using bathroom.  She denies any other episodes.  She denies fall any new injury to the head.  She denies any other warning symptoms.  She reports decreased border intake and likes to drink beer and wine.   In the emergency room vitals and labs within normal limits.     Neurology consult:  Patient was seen examined by me this morning.  She states that she had an episode of syncope/unresponsiveness yesterday while sitting at the kitchen table and playing cards with her .  Patient's home nurse noted that she became unresponsive and  called EMS.  By the time EMS arrived, patient woke up and was back to normal.  She had some confusion as to what happened however she knew where she was.  She did not have any seizure-like activity or bowel bladder incontinence.  She denied any symptoms prior to the episode including lightheadedness or dizziness or headache, vision changes.  She denies any symptoms at this time.  Feels back to baseline.  Sodium on arrival was 122 and she was admitted to the hospital for further workup and management.  She had a CT of the head which was unremarkable for  acute pathology and carotid ultrasound did not reveal any high-grade carotid stenosis.    Of note, patient endorses that she had a syncopal spell about few months ago as well.  EMS was called at that time and her vitals were normal and hence she was not taken to the hospital.    PREVIOUS MEDICAL HISTORY:  No past medical history on file.  PREVIOUS SURGICAL HISTORY:  No past surgical history on file.  FAMILY MEDICAL HISTORY:  No family history on file.  SOCIAL HISTORY:     ALLERGIES:  Review of patient's allergies indicates:  No Known Allergies  HOME MEDICATIONS:  Prior to Admission medications    Medication Sig Start Date End Date Taking? Authorizing Provider   albuterol (ACCUNEB) 1.25 mg/3 mL Nebu Take 1.25 mg by nebulization every 6 (six) hours as needed. Rescue   Yes Historical Provider   alendronate (FOSAMAX) 70 MG tablet Take 70 mg by mouth every 7 days. Saturdays 1/10/23  Yes Historical Provider   ergocalciferol (ERGOCALCIFEROL) 50,000 unit Cap Take 50,000 Units by mouth every 7 days. SATURDAYS 1/10/23  Yes Historical Provider   lisinopriL-hydrochlorothiazide (PRINZIDE,ZESTORETIC) 20-25 mg Tab Take 1 tablet by mouth once daily. 3/2/23  Yes Historical Provider   oxyCODONE-acetaminophen (PERCOCET) 5-325 mg per tablet Take 1 tablet by mouth every 6 (six) hours as needed for Pain. 3/16/23  Yes Kenny Liang MD   rosuvastatin (CRESTOR) 10 MG tablet Take 10 mg by mouth every evening. 3/14/23  Yes Historical Provider   albuterol (PROVENTIL/VENTOLIN HFA) 90 mcg/actuation inhaler Inhale into the lungs. 1/10/23   Historical Provider   cyclobenzaprine (FLEXERIL) 5 MG tablet Take 5 mg by mouth 3 (three) times daily as needed. 1/10/23   Historical Provider   HYDROcodone-acetaminophen (NORCO) 5-325 mg per tablet Take 1 tablet by mouth every 6 (six) hours as needed for Pain. 2/2/23   Kenny Liang MD   ibuprofen (ADVIL,MOTRIN) 800 MG tablet Take 800 mg by mouth every 6 (six) hours as needed. 12/9/22    Historical Provider   oxybutynin (DITROPAN-XL) 10 MG 24 hr tablet Take 10 mg by mouth every evening. 12/2/22   Historical Provider     CURRENT SCHEDULED MEDICATIONS:   enoxaparin  40 mg Subcutaneous Daily    lisinopriL  20 mg Oral Daily    oxybutynin  10 mg Oral QHS     CURRENT INFUSIONS:    CURRENT PRN MEDICATIONS:  acetaminophen, acetaminophen, albuterol sulfate, aluminum-magnesium hydroxide-simethicone, dextrose 10%, dextrose 10%, glucagon (human recombinant), glucose, glucose, melatonin, morphine, naloxone, ondansetron, oxyCODONE, prochlorperazine, simethicone, sodium chloride 0.9%    REVIEW OF SYSTEMS:  Please refer to the HPI for all pertinent positive and negative findings. A comprehensive review of all other systems was negative.       PHYSICAL EXAM:  Patient Vitals for the past 24 hrs:   BP Temp Temp src Pulse Resp SpO2   03/23/23 0911 -- -- -- 84 17 98 %   03/23/23 0754 135/65 97.5 °F (36.4 °C) Oral 81 18 97 %   03/23/23 0608 -- -- -- -- 18 --   03/23/23 0300 129/68 97.8 °F (36.6 °C) Oral 71 16 98 %   03/22/23 2302 137/78 98.3 °F (36.8 °C) Oral 85 17 98 %   03/22/23 2104 -- -- -- -- 18 --   03/22/23 1916 (!) 152/82 98.6 °F (37 °C) Oral 83 17 99 %   03/22/23 1621 (!) 172/74 97.6 °F (36.4 °C) Oral 91 18 100 %   03/22/23 1155 (!) 122/56 98 °F (36.7 °C) Oral 86 18 97 %       GENERAL APPEARANCE: Alert, well-developed, well-nourished female in no acute distress.  HEENT: Normocephalic and atraumatic. PERRL. Oropharynx unremarkable.  PULM: Normal respiratory effort. No accessory muscle use.  CV: RRR.  ABDOMEN: Soft, nontender.  EXTREMITIES: No obvious signs of vascular compromise. Pulses present. No cyanosis, clubbing or edema.  SKIN: Clear; no rashes, lesions or skin breaks in exposed areas.    NEURO:  MENTAL STATUS:   , Patient awake and oriented to time, place, and person, recent/remote memory normal, attention span/concentration normal, speech fluent without paraphasic errors, good comprehension with  appropriate thought content, and fund of knowledge appropriate for patient's level of education.  Affect euthymic.    CRANIAL NERVES:  CN I: Not tested.  CN II: Fundoscopic exam deferred.  CN III, IV, VI: Pupils equal, round and reactive to light.  Extraocular movements full and intact.  CN V: Facial sensation normal.  CN VII: Facial asymmetry absent.  CN VIII: Hearing grossly normal and equal bilaterally.  No skew deviation or pathologic nystagmus.  CN IX, X: Palate elevates symmetrically. Speech/articulation is clear without dysarthria.  CN XI: Shoulder shrug and chin rotation equal with good strength.  CN XII: Tongue protrusion midline.    MOTOR:  Bulk normal. Tone normal and symmetric throughout.  Abnormal movements absent.  Tremor: none present.  Strength 5/5 throughout.    REFLEXES:  DTRs 2+ throughout.  Plantar response downgoing bilaterally.  SENSATION: grossly intact throughout.  COORDINATION: normal finger-to-nose.  STATION: not tested.  GAIT: not tested.      Labs:  Recent Labs   Lab 03/22/23  0203 03/22/23  0551 03/22/23  1813 03/23/23  0026 03/23/23  0610   *   < > 127* 127* 128*   K 4.2   < > 4.5 4.0 4.2   CL 95   < > 97 97 97   CO2 24   < > 24 23 23   BUN 19   < > 13 12 12   CREATININE 0.6   < > 0.6 0.5 0.5   *   < > 98 99 102   CALCIUM 9.3   < > 9.7 9.1 9.4   MG 1.6  --   --   --  1.8    < > = values in this interval not displayed.     Recent Labs   Lab 03/21/23  1230 03/22/23  0203 03/23/23  0610   WBC 7.21 6.42 5.03   HGB 13.1 11.2* 11.9*   HCT 37.7 32.5* 34.4*    205 200     Recent Labs   Lab 03/21/23  1230   ALBUMIN 4.0   PROT 6.8   BILITOT 0.8   ALKPHOS 57   ALT 20   AST 30     No results found for: PT, PTT, INR  No results found for: CHOLTOT, TRIG, HDL, CHOLHDL, LDL  No results found for: HGBA1C  No results found for: PROTEINCSF, GLUCCSF, WBCCSF    Imaging:  I have reviewed and interpreted the pertinent imaging and lab results.      Echo  · Eccentric hypertrophy and low  normal systolic function.  · The estimated ejection fraction is 50%.  · Normal left ventricular diastolic function.  · Mild pulmonic regurgitation.  · Moderate left atrial enlargement.  · Mild-to-moderate mitral regurgitation.  · Moderate tricuspid regurgitation.  · There are segmental left ventricular wall motion abnormalities.  · THE AORTIC VALVE IS NOT WELL SEEN I CAN NOT RULE OUT MARKED THICKENING   OR EVEN A VEGETATION THERE IS A CALCIFICATION OF THE NON CORONARY CUSP AND   THE LEFT CORONARY CUSP IS NOT WELL SEEN consider arnie if indicated or   repeat study to evaluate the aortic valve.  · No old tracing for comparison     CT Head Without Contrast  CMS MANDATED QUALITY DATA - CT RADIATION 436    All CT scans at this facility utilize dose modulation, iterative reconstruction, and/or weight based dosing when appropriate to reduce radiation dose to as low as reasonably achievable.    CLINICAL HISTORY:  77 years (1945) Female Neuro deficit, acute, stroke suspected fall    TECHNIQUE:  CT HEAD WITHOUT IV CONTRAST. Axial CT of the brain without contrast using soft tissue and bone algorithm. .    COMPARISON:  None available.    FINDINGS:  No acute intracranial hemorrhage, hydrocephalus, herniation or midline shift and the basal and suprasellar cisterns are patent. No acute skull fracture is identified.    Mild diffuse cerebral atrophy for age with moderate periventricular deep cerebral white matter low attenuation, a nonspecific finding in this age group which can be seen in any diffuse white matter process but which is most commonly associated with chronic microvascular ischemic disease. There are scattered atheromatous calcifications in the intracranial internal carotid arteries.    Orbital contents appear within normal limits. External auditory canals are unremarkable. The visualized paranasal sinuses and mastoid air cells are essentially clear.    IMPRESSION:  1. No acute intracranial process.  2.  Chronic/involutional findings as noted above.    .    Electronically signed by:  Lorenzo Gomez MD  3/22/2023 4:01 PM CDT Workstation: [id number]         ASSESSMENT & PLAN:      Syncope  Hyponatremia    Plan:   Etiology of patient's episode of syncope is unknown. ?  Secondary to hyponatremia however can not rule out seizures (less likely) or cardiac etiology.  Will get EEG.  CT head and carotid ultrasound were done with no significant pathology to explain her syncopal spell  Recommended MRI brain however patient states she is claustrophobic and would not like to get the MRI.  Echo results reviewed.  If has further episodes of syncopal spells, may need outpatient long-term EEG to characterize these spells and also will need Holter monitor  Management of hyponatremia per primary team  Will follow             Thank you kindly for including us in the care of this patient. Please do not hesitate to contact us with any questions.          Javier Reich MD  Neurology/vascular Neurology  Date of Service: 03/23/2023  9:42 AM    --------------------------------------------------------------------------------------------------------------------------------------------------------------------------------------------------------------------------------------------------------------  Please note: This note was transcribed using voice recognition software. Because of this technology there are often uinintended grammatical, spelling, and other transcription errors. Please disregard these errors.

## 2023-03-23 NOTE — SUBJECTIVE & OBJECTIVE
Interval History:     Review of Systems   Constitutional:  Negative for activity change and appetite change.   HENT:  Negative for congestion and dental problem.    Eyes:  Negative for discharge and itching.   Respiratory:  Negative for shortness of breath.    Cardiovascular:  Negative for chest pain.   Gastrointestinal:  Negative for abdominal distention and abdominal pain.   Endocrine: Negative for cold intolerance.   Genitourinary:  Negative for difficulty urinating and dysuria.   Musculoskeletal:  Negative for arthralgias and back pain.   Skin:  Negative for color change.   Neurological:  Negative for dizziness and facial asymmetry.   Hematological:  Negative for adenopathy.   Psychiatric/Behavioral:  Negative for agitation and behavioral problems.    Objective:     Vital Signs (Most Recent):  Temp: 97.6 °F (36.4 °C) (03/22/23 1621)  Pulse: 91 (03/22/23 1621)  Resp: 18 (03/22/23 1621)  BP: (!) 172/74 (03/22/23 1621)  SpO2: 100 % (03/22/23 1621)   Vital Signs (24h Range):  Temp:  [97.6 °F (36.4 °C)-98.3 °F (36.8 °C)] 97.6 °F (36.4 °C)  Pulse:  [] 91  Resp:  [18] 18  SpO2:  [97 %-100 %] 100 %  BP: ()/(48-82) 172/74     Weight: 70.1 kg (154 lb 8.7 oz)  Body mass index is 25.33 kg/m².    Intake/Output Summary (Last 24 hours) at 3/22/2023 1905  Last data filed at 3/22/2023 1308  Gross per 24 hour   Intake 240 ml   Output --   Net 240 ml      Physical Exam  Vitals and nursing note reviewed.   Constitutional:       General: She is not in acute distress.  HENT:      Head: Atraumatic.      Right Ear: External ear normal.      Left Ear: External ear normal.      Nose: Nose normal.      Mouth/Throat:      Mouth: Mucous membranes are moist.   Cardiovascular:      Rate and Rhythm: Normal rate.   Pulmonary:      Effort: Pulmonary effort is normal.   Musculoskeletal:         General: Normal range of motion.      Cervical back: Normal range of motion.   Skin:     General: Skin is warm.   Neurological:      Mental  Status: She is alert and oriented to person, place, and time.   Psychiatric:         Behavior: Behavior normal.       Significant Labs: All pertinent labs within the past 24 hours have been reviewed.  CBC:   Recent Labs   Lab 03/21/23  1230 03/22/23  0203   WBC 7.21 6.42   HGB 13.1 11.2*   HCT 37.7 32.5*    205     CMP:   Recent Labs   Lab 03/21/23  1230 03/21/23  2058 03/22/23  0551 03/22/23  1137 03/22/23  1813   *   < > 126* 125* 127*   K 4.3   < > 4.2 4.2 4.5   CL 91*   < > 97 96 97   CO2 22*   < > 24 24 24   *   < > 93 110 98   BUN 19   < > 16 14 13   CREATININE 0.6   < > 0.6 0.6 0.6   CALCIUM 9.3   < > 9.0 9.4 9.7   PROT 6.8  --   --   --   --    ALBUMIN 4.0  --   --   --   --    BILITOT 0.8  --   --   --   --    ALKPHOS 57  --   --   --   --    AST 30  --   --   --   --    ALT 20  --   --   --   --    ANIONGAP 9   < > 5* 5* 6*    < > = values in this interval not displayed.       Significant Imaging: I have reviewed all pertinent imaging results/findings within the past 24 hours.

## 2023-03-23 NOTE — PLAN OF CARE
Pt cleared for discharge from case management    Pt will resume HH with [facility], updated clinicals sent to  via [employer]     03/23/23 6457   Final Note   Assessment Type Final Discharge Note   Anticipated Discharge Disposition Home-Health   What phone number can be called within the next 1-3 days to see how you are doing after discharge?   ([phone])

## 2023-03-23 NOTE — HOSPITAL COURSE
Patient got admitted with syncope  Pt was evaluated by Neurologist and CT Scan brain was normal  ECHO Showed aortic valve thickening ?  Cardiology Consult was pending and pt expressed her wish to see Cardio MD on outpatient basis  Pt was found to be hyponatremic and it got better after HCTZ was Discontinued   Later pt was discharged to home and will follow up with Cardiologist in office

## 2023-03-23 NOTE — PLAN OF CARE
Problem: Adult Inpatient Plan of Care  Goal: Plan of Care Review  Outcome: Met  Goal: Patient-Specific Goal (Individualized)  Outcome: Met  Goal: Absence of Hospital-Acquired Illness or Injury  Outcome: Met  Goal: Optimal Comfort and Wellbeing  Outcome: Met  Goal: Readiness for Transition of Care  Outcome: Met     Problem: Fall Injury Risk  Goal: Absence of Fall and Fall-Related Injury  Outcome: Met     Problem: Syncope  Goal: Absence of Syncopal Symptoms  Outcome: Met

## 2023-03-23 NOTE — PROCEDURES
EEG REPORT    NAME: Mirela Calderon  : 1945  MRN: [id number]    DATE of EEG: 3/23/2023    CLINICAL INDICATION: This is a 77 y.o. female being evaluated for syncope.    MEDICATIONS:     enoxaparin  40 mg Subcutaneous Daily    lisinopriL  20 mg Oral Daily    oxybutynin  10 mg Oral QHS        EEG DESCRIPTION:  A 16-channel EEG was performed with electrode placement in 10/20 [employer] System. Longitudinal bipolar and referential montages were utilized in analysis.    This is a technically adequate study with minor muscle and motion artifacts.    The dominant posterior background rhythm was a well modulated, symmetric, synchronous, reactive, 8-9 Hz rhythm.    The record was reactive to eye opening and closure. Anterior derivations showed the expected admixture of low-voltage beta and theta frequencies as well as intermittent eye blink artifact.    Drowsiness was characterized by voltage attenuation and lateral eye movements.    Stage 2 sleep structures were not seen    Photic stimulation, performed elicited no driving response. Hyperventilation  was not performed.    No epileptiform discharges were recorded. No electrographic or electroclinical seizures were recorded.    DIAGNOSIS: This is a normal EEG. No lateralizing or epileptiform features are noted. No electrographic or electroclinical seizures are recorded.        Javier Reich MD  Neurology

## 2023-03-23 NOTE — CARE UPDATE
03/23/23 0911   Patient Assessment/Suction   Level of Consciousness (AVPU) alert   Respiratory Effort Unlabored   Expansion/Accessory Muscles/Retractions no use of accessory muscles   All Lung Fields Breath Sounds clear   Rhythm/Pattern, Respiratory unlabored   Cough Frequency infrequent   Cough Type nonproductive   PRE-TX-O2   Device (Oxygen Therapy) room air   SpO2 98 %   Pulse Oximetry Type Intermittent   $ Pulse Oximetry - Multiple Charge Pulse Oximetry - Multiple   Pulse 84   Resp 17   Aerosol Therapy   $ Aerosol Therapy Charges PRN treatment not required   Education   $ Education Bronchodilator;15 min   Respiratory Evaluation   $ Care Plan Tech Time 15 min   $ Eval/Re-eval Charges Re-evaluation

## 2023-03-23 NOTE — PROGRESS NOTES
[facility]  Progress Note    Patient Name: Mirela Calderon  MRN: [id number]  Patient Class: OP- Observation   Admission Date: 3/21/2023  Length of Stay: 0 days  Attending Physician: Pedro Luis Jamison MD  Primary Care Provider: Martín Sy MD        Subjective:     Principal Problem:Syncope        HPI:  No notes on file    Overview/Hospital Course:  3/22  No new issues since admission  CT head WNL  ECHO results pending       Interval History:     Review of Systems   Constitutional:  Negative for activity change and appetite change.   HENT:  Negative for congestion and dental problem.    Eyes:  Negative for discharge and itching.   Respiratory:  Negative for shortness of breath.    Cardiovascular:  Negative for chest pain.   Gastrointestinal:  Negative for abdominal distention and abdominal pain.   Endocrine: Negative for cold intolerance.   Genitourinary:  Negative for difficulty urinating and dysuria.   Musculoskeletal:  Negative for arthralgias and back pain.   Skin:  Negative for color change.   Neurological:  Negative for dizziness and facial asymmetry.   Hematological:  Negative for adenopathy.   Psychiatric/Behavioral:  Negative for agitation and behavioral problems.    Objective:     Vital Signs (Most Recent):  Temp: 97.6 °F (36.4 °C) (03/22/23 1621)  Pulse: 91 (03/22/23 1621)  Resp: 18 (03/22/23 1621)  BP: (!) 172/74 (03/22/23 1621)  SpO2: 100 % (03/22/23 1621)   Vital Signs (24h Range):  Temp:  [97.6 °F (36.4 °C)-98.3 °F (36.8 °C)] 97.6 °F (36.4 °C)  Pulse:  [] 91  Resp:  [18] 18  SpO2:  [97 %-100 %] 100 %  BP: ()/(48-82) 172/74     Weight: 70.1 kg (154 lb 8.7 oz)  Body mass index is 25.33 kg/m².    Intake/Output Summary (Last 24 hours) at 3/22/2023 1905  Last data filed at 3/22/2023 1308  Gross per 24 hour   Intake 240 ml   Output --   Net 240 ml      Physical Exam  Vitals and nursing note reviewed.   Constitutional:       General: She is not in acute  distress.  HENT:      Head: Atraumatic.      Right Ear: External ear normal.      Left Ear: External ear normal.      Nose: Nose normal.      Mouth/Throat:      Mouth: Mucous membranes are moist.   Cardiovascular:      Rate and Rhythm: Normal rate.   Pulmonary:      Effort: Pulmonary effort is normal.   Musculoskeletal:         General: Normal range of motion.      Cervical back: Normal range of motion.   Skin:     General: Skin is warm.   Neurological:      Mental Status: She is alert and oriented to person, place, and time.   Psychiatric:         Behavior: Behavior normal.       Significant Labs: All pertinent labs within the past 24 hours have been reviewed.  CBC:   Recent Labs   Lab 03/21/23  1230 03/22/23  0203   WBC 7.21 6.42   HGB 13.1 11.2*   HCT 37.7 32.5*    205     CMP:   Recent Labs   Lab 03/21/23  1230 03/21/23  2058 03/22/23  0551 03/22/23  1137 03/22/23  1813   *   < > 126* 125* 127*   K 4.3   < > 4.2 4.2 4.5   CL 91*   < > 97 96 97   CO2 22*   < > 24 24 24   *   < > 93 110 98   BUN 19   < > 16 14 13   CREATININE 0.6   < > 0.6 0.6 0.6   CALCIUM 9.3   < > 9.0 9.4 9.7   PROT 6.8  --   --   --   --    ALBUMIN 4.0  --   --   --   --    BILITOT 0.8  --   --   --   --    ALKPHOS 57  --   --   --   --    AST 30  --   --   --   --    ALT 20  --   --   --   --    ANIONGAP 9   < > 5* 5* 6*    < > = values in this interval not displayed.       Significant Imaging: I have reviewed all pertinent imaging results/findings within the past 24 hours.      Assessment/Plan:      * Syncope  Admitted with syncope/LOC for about 2 minutes  Pt had exact same incident 6 months ago  Awaiting ECHO  CT Brain WNL  Pt c/o drooling of saliva all day yesterday  EKG showed PVC s  Consult Neurology/Cardiology  USS Carotid results noted     Hyponatremia  Stopped HCTZ  Monitor Na levels        VTE Risk Mitigation (From admission, onward)         Ordered     enoxaparin injection 40 mg  Daily         03/21/23 3966      IP VTE HIGH RISK PATIENT  Once         03/21/23 1753     Place sequential compression device  Until discontinued         03/21/23 1753                Discharge Planning   CLAY: 3/22/2023     Code Status: Full Code   Is the patient medically ready for discharge?:     Reason for patient still in hospital (select all that apply): Treatment  Discharge Plan A: Home Health                  Pedro Luis Jamison MD  Department of Hospital Medicine   [facility]

## 2023-03-24 NOTE — DISCHARGE SUMMARY
[facility] Medicine  Discharge Summary      Patient Name: Mirela Calderon  MRN: [id number]  RUTH: [id number]  Patient Class: OP- Observation  Admission Date: 3/21/2023  Hospital Length of Stay: 0 days  Discharge Date and Time: 3/23/2023  5:45 PM  Attending Physician: No att. providers found   Discharging Provider: Pedro Luis Jamison MD  Primary Care Provider: Martín Sy MD    Primary Care Team: Networked reference to record PCT     HPI:   No notes on file    * No surgery found *      Hospital Course:   Patient got admitted with syncope  Pt was evaluated by Neurologist and CT Scan brain was normal  ECHO Showed aortic valve thickening ?  Cardiology Consult was pending and pt expressed her wish to see Cardio MD on outpatient basis  Pt was found to be hyponatremic and it got better after HCTZ was Discontinued   Later pt was discharged to home and will follow up with Cardiologist in office        Goals of Care Treatment Preferences:  Code Status: Full Code      Consults:   Consults (From admission, onward)          Status Ordering Provider     Inpatient consult to Neurology  Once        Provider:  Javier Reich MD    Completed PEDRO LUIS JAMISON            No new Assessment & Plan notes have been filed under this hospital service since the last note was generated.  Service: Hospital Medicine    Final Active Diagnoses:      Problems Resolved During this Admission:    Diagnosis Date Noted Date Resolved POA    PRINCIPAL PROBLEM:  Syncope [R55] 03/21/2023 03/23/2023 Yes    Hyponatremia [E87.1] 03/21/2023 03/23/2023 Yes       Discharged Condition: good    Disposition: Home-Health Care Svc    Follow Up:   Follow-up Information       Wanda Pacheco MD Follow up in 1 week(s).    Specialty: Cardiology  Contact information:  [address] [place] [address]  [address] [place] [id number]  [phone]                           Patient Instructions:      Ambulatory referral/consult to Outpatient Case Management   Referral  Priority: Routine Referral Type: Consultation   Referral Reason: Specialty Services Required   Number of Visits Requested: 1     Ambulatory referral/consult to [facility]   Standing Status: Future   Referral Priority: Routine Referral Type: Consultation   Referral Reason: Specialty Services Required   Number of Visits Requested: 1     Ambulatory referral/consult to Cardiology   Standing Status: Future   Referral Priority: Urgent Referral Type: Consultation   Referral Reason: Specialty Services Required   Referred to Provider: ANA DALLAS Requested Specialty: Cardiology   Number of Visits Requested: 1       Significant Diagnostic Studies: Labs:   CMP   Recent Labs   Lab 03/23/23  0026 03/23/23  0610 03/23/23  1224   * 128* 129*   K 4.0 4.2 4.5   CL 97 97 95   CO2 23 23 22*   GLU 99 102 100   BUN 12 12 13   CREATININE 0.5 0.5 0.5   CALCIUM 9.1 9.4 9.9   ANIONGAP 7* 8 12    and CBC   Recent Labs   Lab 03/23/23  0610   WBC 5.03   HGB 11.9*   HCT 34.4*          Pending Diagnostic Studies:       None           Medications:  Reconciled Home Medications:      Medication List        START taking these medications      lisinopriL 20 MG tablet  Commonly known as: PRINIVIL,ZESTRIL  Take 1 tablet (20 mg total) by mouth once daily.            CONTINUE taking these medications      * albuterol 90 mcg/actuation inhaler  Commonly known as: PROVENTIL/VENTOLIN HFA  Inhale into the lungs.     * albuterol 1.25 mg/3 mL Nebu  Commonly known as: ACCUNEB  Take 1.25 mg by nebulization every 6 (six) hours as needed. Rescue     alendronate 70 MG tablet  Commonly known as: FOSAMAX  Take 70 mg by mouth every 7 days. Saturdays     cyclobenzaprine 5 MG tablet  Commonly known as: FLEXERIL  Take 5 mg by mouth 3 (three) times daily as needed.     ergocalciferol 50,000 unit Cap  Commonly known as: ERGOCALCIFEROL  Take 50,000 Units by mouth every 7 days. SATURDAYS     HYDROcodone-acetaminophen 5-325 mg per  tablet  Commonly known as: NORCO  Take 1 tablet by mouth every 6 (six) hours as needed for Pain.     ibuprofen 800 MG tablet  Commonly known as: ADVIL,MOTRIN  Take 800 mg by mouth every 6 (six) hours as needed.     oxybutynin 10 MG 24 hr tablet  Commonly known as: DITROPAN-XL  Take 10 mg by mouth every evening.     rosuvastatin 10 MG tablet  Commonly known as: CRESTOR  Take 10 mg by mouth every evening.           * This list has 2 medication(s) that are the same as other medications prescribed for you. Read the directions carefully, and ask your doctor or other care provider to review them with you.                STOP taking these medications      lisinopriL-hydrochlorothiazide 20-25 mg Tab  Commonly known as: PRINZIDE,ZESTORETIC     oxyCODONE-acetaminophen 5-325 mg per tablet  Commonly known as: PERCOCET              Indwelling Lines/Drains at time of discharge:   Lines/Drains/Airways       None                 Physical Exam   Cardiovascular: Normal rate.   Neurological: She is alert.   Time spent on the discharge of patient: 32 minutes         Pedro Luis Jamison MD  Department of Hospital Medicine  [facility]

## 2023-03-27 ENCOUNTER — PES CALL (OUTPATIENT)
Dept: ADMINISTRATIVE | Facility: CLINIC | Age: 78
End: 2023-03-27
Payer: MEDICARE

## 2023-03-28 ENCOUNTER — TELEPHONE (OUTPATIENT)
Dept: CARDIOLOGY | Facility: CLINIC | Age: 78
End: 2023-03-28
Payer: MEDICARE

## 2023-03-28 ENCOUNTER — PES CALL (OUTPATIENT)
Dept: ADMINISTRATIVE | Facility: CLINIC | Age: 78
End: 2023-03-28
Payer: MEDICARE

## 2023-03-28 ENCOUNTER — PATIENT OUTREACH (OUTPATIENT)
Dept: ADMINISTRATIVE | Facility: OTHER | Age: 78
End: 2023-03-28
Payer: MEDICARE

## 2023-03-28 NOTE — TELEPHONE ENCOUNTER
----- Message from India Rousseau sent at 3/28/2023 11:02 AM CDT -----  We were consulted on this patient but she went home before we saw her.  Dr Jamison told her to follow up with Dr Pacheco within one week of discharge.  I can't find anything until May.  Can you work in?  Shes a new patient.   THX

## 2023-03-28 NOTE — PROGRESS NOTES
CHW - Outreach Attempt    Community Health Worker left a voicemail message for 1st attempt to contact patient regarding: case management    Community Health Worker to attempt to contact patient on: 3/28/23

## 2023-03-28 NOTE — TELEPHONE ENCOUNTER
Spoke with pt, states she is in [place] and will call when she gets back to [place] to make an appointment.

## 2023-03-30 NOTE — PROGRESS NOTES
CHW - Outreach Attempt    Community Health Worker left a voicemail message for 2nd attempt to contact patient regarding: case management    Community Health Worker to attempt to contact patient on: 3/30/23

## 2023-03-31 NOTE — PROGRESS NOTES
CHW - Outreach Attempt    Community Health Worker left a voicemail message for 3rd attempt to contact patient regarding: case management    Community Health Worker to attempt to contact patient on: 3/31/23

## 2023-04-19 ENCOUNTER — DOCUMENT SCAN (OUTPATIENT)
Dept: HOME HEALTH SERVICES | Facility: HOSPITAL | Age: 78
End: 2023-04-19
Payer: MEDICARE

## 2023-04-28 ENCOUNTER — EXTERNAL HOME HEALTH (OUTPATIENT)
Dept: HOME HEALTH SERVICES | Facility: HOSPITAL | Age: 78
End: 2023-04-28
Payer: MEDICARE

## 2023-05-10 DIAGNOSIS — M25.511 RIGHT SHOULDER PAIN, UNSPECIFIED CHRONICITY: Primary | ICD-10-CM

## 2023-12-08 ENCOUNTER — TELEPHONE (OUTPATIENT)
Dept: FAMILY MEDICINE | Facility: CLINIC | Age: 78
End: 2023-12-08

## 2023-12-08 NOTE — TELEPHONE ENCOUNTER
Left voice message for patient to return call.     Contacting patient about upcoming appointment to confirm. Informing patient to bring medication bottles to appointment.

## 2023-12-11 NOTE — TELEPHONE ENCOUNTER
Contacting patient for her to confirm her establish care appointment and to please remember to bring bottles to upcoming appointment.   Patient confirms appointment and will be bringing her bottles. Patient was informed if the establish appointment is no showed there will be no rescheduling . Patient states she will be there.

## 2023-12-13 ENCOUNTER — OFFICE VISIT (OUTPATIENT)
Dept: FAMILY MEDICINE | Facility: CLINIC | Age: 78
End: 2023-12-13
Payer: MEDICARE

## 2023-12-13 VITALS
BODY MASS INDEX: 28.74 KG/M2 | SYSTOLIC BLOOD PRESSURE: 152 MMHG | HEART RATE: 110 BPM | HEIGHT: 63 IN | DIASTOLIC BLOOD PRESSURE: 96 MMHG | OXYGEN SATURATION: 99 % | WEIGHT: 162.19 LBS

## 2023-12-13 DIAGNOSIS — R26.81 UNSTEADY GAIT: ICD-10-CM

## 2023-12-13 DIAGNOSIS — I10 PRIMARY HYPERTENSION: ICD-10-CM

## 2023-12-13 DIAGNOSIS — Z13.1 DIABETES MELLITUS SCREENING: ICD-10-CM

## 2023-12-13 DIAGNOSIS — Z78.9 ALCOHOL USE: ICD-10-CM

## 2023-12-13 DIAGNOSIS — Z78.0 MENOPAUSE: ICD-10-CM

## 2023-12-13 DIAGNOSIS — Z13.820 SCREENING FOR OSTEOPOROSIS: ICD-10-CM

## 2023-12-13 DIAGNOSIS — E55.9 VITAMIN D DEFICIENCY: ICD-10-CM

## 2023-12-13 DIAGNOSIS — M41.9 SCOLIOSIS OF THORACOLUMBAR SPINE, UNSPECIFIED SCOLIOSIS TYPE: ICD-10-CM

## 2023-12-13 DIAGNOSIS — Z87.898 HISTORY OF ANGIOEDEMA: ICD-10-CM

## 2023-12-13 DIAGNOSIS — J45.20 MILD INTERMITTENT ASTHMA WITHOUT COMPLICATION: ICD-10-CM

## 2023-12-13 DIAGNOSIS — Z00.00 ANNUAL PHYSICAL EXAM: Primary | ICD-10-CM

## 2023-12-13 DIAGNOSIS — Z79.899 OTHER LONG TERM (CURRENT) DRUG THERAPY: ICD-10-CM

## 2023-12-13 DIAGNOSIS — I34.0 MITRAL VALVE INSUFFICIENCY, UNSPECIFIED ETIOLOGY: ICD-10-CM

## 2023-12-13 PROBLEM — F10.90 ALCOHOL USE: Status: ACTIVE | Noted: 2023-12-13

## 2023-12-13 PROBLEM — T78.3XXA ANGIO-EDEMA: Status: ACTIVE | Noted: 2023-12-13

## 2023-12-13 PROCEDURE — 1159F PR MEDICATION LIST DOCUMENTED IN MEDICAL RECORD: ICD-10-PCS | Mod: CPTII,S$GLB,, | Performed by: NURSE PRACTITIONER

## 2023-12-13 PROCEDURE — 3288F PR FALLS RISK ASSESSMENT DOCUMENTED: ICD-10-PCS | Mod: CPTII,S$GLB,, | Performed by: NURSE PRACTITIONER

## 2023-12-13 PROCEDURE — 3288F FALL RISK ASSESSMENT DOCD: CPT | Mod: CPTII,S$GLB,, | Performed by: NURSE PRACTITIONER

## 2023-12-13 PROCEDURE — 3080F DIAST BP >= 90 MM HG: CPT | Mod: CPTII,S$GLB,, | Performed by: NURSE PRACTITIONER

## 2023-12-13 PROCEDURE — 1101F PT FALLS ASSESS-DOCD LE1/YR: CPT | Mod: CPTII,S$GLB,, | Performed by: NURSE PRACTITIONER

## 2023-12-13 PROCEDURE — 3080F PR MOST RECENT DIASTOLIC BLOOD PRESSURE >= 90 MM HG: ICD-10-PCS | Mod: CPTII,S$GLB,, | Performed by: NURSE PRACTITIONER

## 2023-12-13 PROCEDURE — 3077F SYST BP >= 140 MM HG: CPT | Mod: CPTII,S$GLB,, | Performed by: NURSE PRACTITIONER

## 2023-12-13 PROCEDURE — 1160F PR REVIEW ALL MEDS BY PRESCRIBER/CLIN PHARMACIST DOCUMENTED: ICD-10-PCS | Mod: CPTII,S$GLB,, | Performed by: NURSE PRACTITIONER

## 2023-12-13 PROCEDURE — 99205 OFFICE O/P NEW HI 60 MIN: CPT | Mod: S$GLB,,, | Performed by: NURSE PRACTITIONER

## 2023-12-13 PROCEDURE — 99205 PR OFFICE/OUTPT VISIT, NEW, LEVL V, 60-74 MIN: ICD-10-PCS | Mod: S$GLB,,, | Performed by: NURSE PRACTITIONER

## 2023-12-13 PROCEDURE — 1101F PR PT FALLS ASSESS DOC 0-1 FALLS W/OUT INJ PAST YR: ICD-10-PCS | Mod: CPTII,S$GLB,, | Performed by: NURSE PRACTITIONER

## 2023-12-13 PROCEDURE — 1160F RVW MEDS BY RX/DR IN RCRD: CPT | Mod: CPTII,S$GLB,, | Performed by: NURSE PRACTITIONER

## 2023-12-13 PROCEDURE — 3077F PR MOST RECENT SYSTOLIC BLOOD PRESSURE >= 140 MM HG: ICD-10-PCS | Mod: CPTII,S$GLB,, | Performed by: NURSE PRACTITIONER

## 2023-12-13 PROCEDURE — 1159F MED LIST DOCD IN RCRD: CPT | Mod: CPTII,S$GLB,, | Performed by: NURSE PRACTITIONER

## 2023-12-13 RX ORDER — AMLODIPINE BESYLATE 5 MG/1
5 TABLET ORAL DAILY
COMMUNITY
End: 2023-12-13 | Stop reason: SDUPTHER

## 2023-12-13 RX ORDER — AMLODIPINE BESYLATE 5 MG/1
5 TABLET ORAL DAILY
Qty: 90 TABLET | Refills: 1 | Status: SHIPPED | OUTPATIENT
Start: 2023-12-13 | End: 2024-01-03

## 2023-12-13 RX ORDER — EPINEPHRINE 0.3 MG/.3ML
1 INJECTION SUBCUTANEOUS ONCE
Qty: 0.3 ML | Refills: 0 | Status: SHIPPED | OUTPATIENT
Start: 2023-12-13 | End: 2023-12-13

## 2023-12-13 RX ORDER — CYCLOBENZAPRINE HCL 5 MG
5 TABLET ORAL 2 TIMES DAILY PRN
Qty: 40 TABLET | Refills: 2 | Status: SHIPPED | OUTPATIENT
Start: 2023-12-13

## 2023-12-13 RX ORDER — ASPIRIN 81 MG/1
81 TABLET ORAL DAILY
COMMUNITY

## 2023-12-13 RX ORDER — ALBUTEROL SULFATE 90 UG/1
1-2 AEROSOL, METERED RESPIRATORY (INHALATION) EVERY 6 HOURS PRN
Qty: 18 G | Refills: 2 | Status: SHIPPED | OUTPATIENT
Start: 2023-12-13

## 2023-12-13 NOTE — PATIENT INSTRUCTIONS
Luis Rosa MD- cardiologist- call to schedule appointment  [address]  [place] [address]  Phone: [phone]    Have fasting labs drawn within the next couple weeks    Come back to have blood pressure rechecked in 2 weeks

## 2023-12-13 NOTE — PROGRESS NOTES
SUBJECTIVE:    Patient ID: Mirela Calderon is a 78 y.o. female.    Chief Complaint: Establish Care (Pt brought medication list//Pt is here to establish care with a PCP//Pt decline flu vaccine and dexa scan//DANE )      Pt here for new pt appt. Former pt of Dr. Sy in [place] though hasn't seen him in about 1.5 years. Pt here with her sister, Edith Montano    Pt reports overall doing okay. Admits was running out of amlodipine so hasn't been taking it every day. Reports was seen in out of town ER in April after she developed tongue swelling- diagnosed with angioedema and lisinopril stopped. Reports had one other episode of tongue swelling which resolved with benadryl. Was told to take benadryl nightly since ER visit and reports the one day she didn't take it was the day she had the tongue swelling. Pt reports she lives in [facility] and EMS can often take some time to get to her- requesting epipen to have available    Hx of asthma since childhood- only has albuterol inhaler which she uses rarely. Denies any nocturnal symptoms. Admits to some mild Sob with heavy exertion but no SOB with normal activity. Social smoker in her 30s but none since then    Had hospital admit March 2023 with syncopal episode. CT of head negative, 50% carotid stenosis, echo showed eccentric LV hypertrophy, EF 50%, mod LAE, mild-mod MR, mod TR, mention of aortic valve not well seen and cold not rule out marked thickening or even vegetation- consider EM. Hyponatremia on labs and HCTZ discontinued. Pt reports she never followed up with cardiology after that hospital stay though sister is requesting cards referral    Pt c/o's of unsteady gait, often feels off balance but denies any recent falls. Fell last year suffering humerus fracture. Sister asking for physical therapy. Pt has cane she will use occasionally. Hx of scoliosis with chronic mid to lower back pain     and lives alone. Still active around the house.    Admits she  drinks 5 beers a day and 2 glasses of wine      No visits with results within 6 Month(s) from this visit.   Latest known visit with results is:   No results displayed because visit has over 200 results.          Past Medical History:   Diagnosis Date    Asthma     Hyperlipidemia     Hypertension      History reviewed. No pertinent surgical history.  Family History   Problem Relation Age of Onset    Heart disease Mother     Cancer Father         bone    Diabetes Paternal Grandmother        Tests to Keep You Healthy    Last Blood Pressure <= 139/89 (12/13/2023): NO      Marital Status:   Alcohol History:  reports current alcohol use of about 49.0 standard drinks of alcohol per week.  Tobacco History:  reports that she has quit smoking. Her smoking use included cigarettes. She has never been exposed to tobacco smoke. She has never used smokeless tobacco.  Drug History:  reports no history of drug use.    Review of patient's allergies indicates:   Allergen Reactions    Lisinopril Anaphylaxis       Current Outpatient Medications:     albuterol (PROVENTIL/VENTOLIN HFA) 90 mcg/actuation inhaler, Inhale 1-2 puffs into the lungs every 6 (six) hours as needed for Wheezing., Disp: 18 g, Rfl: 2    amLODIPine (NORVASC) 5 MG tablet, Take 1 tablet (5 mg total) by mouth once daily., Disp: 90 tablet, Rfl: 1    aspirin (ECOTRIN) 81 MG EC tablet, Take 81 mg by mouth once daily., Disp: , Rfl:     cyclobenzaprine (FLEXERIL) 5 MG tablet, Take 1 tablet (5 mg total) by mouth 2 (two) times daily as needed for Muscle spasms., Disp: 40 tablet, Rfl: 2    EPINEPHrine (EPIPEN 2-CLAUDIA) 0.3 mg/0.3 mL AtIn, Inject 0.3 mLs (0.3 mg total) into the muscle once. As needed for anaphylactic reaction for 1 dose, Disp: 0.3 mL, Rfl: 0    Review of Systems   Constitutional:  Negative for appetite change, chills, fever and unexpected weight change.   HENT:  Negative for sore throat and trouble swallowing.    Eyes:  Negative for visual disturbance.  "  Respiratory:  Negative for cough, shortness of breath and wheezing.    Cardiovascular:  Negative for chest pain, palpitations and leg swelling.   Gastrointestinal:  Positive for diarrhea (last week had a couple days of diarrhea, now resolved). Negative for abdominal pain, constipation, nausea and vomiting.   Genitourinary:  Positive for frequency. Negative for dysuria and hematuria.   Musculoskeletal:  Positive for arthralgias (chronic right shoulder pain with limited ROM since humerus fracture last year), back pain (chronic mid to lower back pain) and gait problem. Myalgias: "unsteady".  Skin:  Negative for rash.   Neurological:  Negative for dizziness, syncope, speech difficulty, numbness and headaches.   Psychiatric/Behavioral:  Negative for confusion, dysphoric mood and sleep disturbance. The patient is not nervous/anxious.           Objective:      Vitals:    12/13/23 1018 12/13/23 1027   BP: (!) 146/94 (!) 152/96   Pulse: 110    SpO2: 99%    Weight: 73.6 kg (162 lb 3.2 oz)    Height: 5' 2.5" (1.588 m)      Physical Exam  Vitals reviewed.   Constitutional:       General: She is not in acute distress.     Appearance: She is well-developed.      Comments: Elderly WF   HENT:      Head: Normocephalic and atraumatic.      Right Ear: Tympanic membrane and ear canal normal.      Left Ear: Tympanic membrane and ear canal normal.      Mouth/Throat:      Pharynx: No posterior oropharyngeal erythema.   Neck:      Vascular: No carotid bruit.   Cardiovascular:      Rate and Rhythm: Regular rhythm. Tachycardia present.      Heart sounds: No murmur heard.     Comments: Apical DV=919  Pulmonary:      Effort: Pulmonary effort is normal. No respiratory distress.      Breath sounds: Normal breath sounds. No wheezing or rales.   Abdominal:      General: There is no distension.      Palpations: Abdomen is soft.      Tenderness: There is no abdominal tenderness.   Musculoskeletal:      Cervical back: Neck supple.      Thoracic " back: Deformity (marked kyphoscoliosis) present. No bony tenderness.      Right lower leg: No edema.      Left lower leg: No edema.   Lymphadenopathy:      Cervical: No cervical adenopathy.   Skin:     General: Skin is warm and dry.      Findings: No rash.   Neurological:      General: No focal deficit present.      Mental Status: She is alert and oriented to person, place, and time.      Gait: Gait abnormal (wide based shuffling gait).   Psychiatric:         Mood and Affect: Mood normal.           Assessment:       1. Annual physical exam    2. Primary hypertension    3. Mild intermittent asthma without complication    4. Scoliosis of thoracolumbar spine, unspecified scoliosis type    5. Unsteady gait    6. Alcohol use    7. Mitral valve insufficiency, unspecified etiology    8. Vitamin D deficiency    9. History of angioedema    10. Diabetes mellitus screening    11. Other long term (current) drug therapy    12. Menopause    13. Screening for osteoporosis           Plan:       Annual physical exam  -baseline labs ordered  -     CBC Auto Differential; Future; Expected date: 12/13/2023  -     Comprehensive Metabolic Panel; Future; Expected date: 12/13/2023  -     Lipid Panel; Future; Expected date: 12/13/2023  -     TSH w/reflex to FT4; Future; Expected date: 12/13/2023  -     Urinalysis, Reflex to Urine Culture Urine, Clean Catch; Future; Expected date: 12/13/2023  -     Microalbumin/Creatinine Ratio, Urine; Future; Expected date: 12/13/2023    Primary hypertension  -BP elevated today though she admits hasn't been taking amlodipine regularly- will refill meds though recommend monitoring BP and return in 2 weeks for BP recheck  -     CBC Auto Differential; Future; Expected date: 12/13/2023  -     Comprehensive Metabolic Panel; Future; Expected date: 12/13/2023  -     Lipid Panel; Future; Expected date: 12/13/2023  -     TSH w/reflex to FT4; Future; Expected date: 12/13/2023  -     Urinalysis, Reflex to Urine Culture  Urine, Clean Catch; Future; Expected date: 12/13/2023  -     Microalbumin/Creatinine Ratio, Urine; Future; Expected date: 12/13/2023  -     amLODIPine (NORVASC) 5 MG tablet; Take 1 tablet (5 mg total) by mouth once daily.  Dispense: 90 tablet; Refill: 1  -     Ambulatory referral/consult to Cardiology; Future; Expected date: 12/20/2023    Mild intermittent asthma without complication  -stable, uses albuterol rarely  -     albuterol (PROVENTIL/VENTOLIN HFA) 90 mcg/actuation inhaler; Inhale 1-2 puffs into the lungs every 6 (six) hours as needed for Wheezing.  Dispense: 18 g; Refill: 2    Scoliosis of thoracolumbar spine, unspecified scoliosis type  -c/o's of chronic mid to lower back- will refill cyclobenzaprine though cautioned on side effects  -     cyclobenzaprine (FLEXERIL) 5 MG tablet; Take 1 tablet (5 mg total) by mouth 2 (two) times daily as needed for Muscle spasms.  Dispense: 40 tablet; Refill: 2  -     Ambulatory referral/consult to Home Health; Future; Expected date: 12/14/2023    Unsteady gait  -HH PT ordered  -     Ambulatory referral/consult to Home Health; Future; Expected date: 12/14/2023    Alcohol use   -pt cautioned her current intake is considered excessive and recommend cutitng back on ETOH intake    Mitral valve insufficiency, unspecified etiology  -abnormal echo with episode of syncope earlier this year, refer to cards  -     Ambulatory referral/consult to Cardiology; Future; Expected date: 12/20/2023    Vitamin D deficiency  -     Vitamin D; Future; Expected date: 12/13/2023    History of angioedema  -     EPINEPHrine (EPIPEN 2-CLAUDIA) 0.3 mg/0.3 mL AtIn; Inject 0.3 mLs (0.3 mg total) into the muscle once. As needed for anaphylactic reaction for 1 dose  Dispense: 0.3 mL; Refill: 0    Diabetes mellitus screening  -     Hemoglobin A1C; Future; Expected date: 12/13/2023    Other long term (current) drug therapy  -     Hemoglobin A1C; Future; Expected date: 12/13/2023      Follow up in about 3 months  (around 3/13/2024) for nurse visit in 2 weeks for BP check, HTN.        12/13/2023 Ally Tovar NP

## 2023-12-27 ENCOUNTER — CLINICAL SUPPORT (OUTPATIENT)
Dept: FAMILY MEDICINE | Facility: CLINIC | Age: 78
End: 2023-12-27
Payer: MEDICARE

## 2023-12-27 VITALS — SYSTOLIC BLOOD PRESSURE: 154 MMHG | HEART RATE: 107 BPM | OXYGEN SATURATION: 99 % | DIASTOLIC BLOOD PRESSURE: 80 MMHG

## 2023-12-27 DIAGNOSIS — I10 PRIMARY HYPERTENSION: Primary | ICD-10-CM

## 2023-12-27 NOTE — PROGRESS NOTES
Pt is here for a nurse visit, blood pressure check. Pt is taken amlodipine 5 mg once daily. Per Ally, increase amlodipine to 10 mg daily. RTC in 2 weeks for a nurse visit blood pressure check.

## 2023-12-28 LAB
25(OH)D3 SERPL-MCNC: 33 NG/ML (ref 30–100)
ALBUMIN SERPL-MCNC: 4.5 G/DL (ref 3.6–5.1)
ALBUMIN/GLOB SERPL: 1.6 (CALC) (ref 1–2.5)
ALP SERPL-CCNC: 72 U/L (ref 37–153)
ALT SERPL-CCNC: 12 U/L (ref 6–29)
AST SERPL-CCNC: 17 U/L (ref 10–35)
BASOPHILS # BLD AUTO: 18 CELLS/UL (ref 0–200)
BASOPHILS NFR BLD AUTO: 0.3 %
BILIRUB SERPL-MCNC: 0.9 MG/DL (ref 0.2–1.2)
BUN SERPL-MCNC: 13 MG/DL (ref 7–25)
BUN/CREAT SERPL: ABNORMAL (CALC) (ref 6–22)
CALCIUM SERPL-MCNC: 10.2 MG/DL (ref 8.6–10.4)
CHLORIDE SERPL-SCNC: 101 MMOL/L (ref 98–110)
CHOLEST SERPL-MCNC: 244 MG/DL
CHOLEST/HDLC SERPL: 3.9 (CALC)
CO2 SERPL-SCNC: 20 MMOL/L (ref 20–32)
CREAT SERPL-MCNC: 0.66 MG/DL (ref 0.6–1)
EGFR: 91 ML/MIN/1.73M2
EOSINOPHIL # BLD AUTO: 153 CELLS/UL (ref 15–500)
EOSINOPHIL NFR BLD AUTO: 2.6 %
ERYTHROCYTE [DISTWIDTH] IN BLOOD BY AUTOMATED COUNT: 11.9 % (ref 11–15)
GLOBULIN SER CALC-MCNC: 2.9 G/DL (CALC) (ref 1.9–3.7)
GLUCOSE SERPL-MCNC: 104 MG/DL (ref 65–99)
HBA1C MFR BLD: 4.8 % OF TOTAL HGB
HCT VFR BLD AUTO: 46 % (ref 35–45)
HDLC SERPL-MCNC: 63 MG/DL
HGB BLD-MCNC: 15.6 G/DL (ref 11.7–15.5)
LDLC SERPL CALC-MCNC: 160 MG/DL (CALC)
LYMPHOCYTES # BLD AUTO: 2136 CELLS/UL (ref 850–3900)
LYMPHOCYTES NFR BLD AUTO: 36.2 %
MCH RBC QN AUTO: 32 PG (ref 27–33)
MCHC RBC AUTO-ENTMCNC: 33.9 G/DL (ref 32–36)
MCV RBC AUTO: 94.3 FL (ref 80–100)
MONOCYTES # BLD AUTO: 690 CELLS/UL (ref 200–950)
MONOCYTES NFR BLD AUTO: 11.7 %
NEUTROPHILS # BLD AUTO: 2903 CELLS/UL (ref 1500–7800)
NEUTROPHILS NFR BLD AUTO: 49.2 %
NONHDLC SERPL-MCNC: 181 MG/DL (CALC)
PLATELET # BLD AUTO: 276 THOUSAND/UL (ref 140–400)
PMV BLD REES-ECKER: 9.8 FL (ref 7.5–12.5)
POTASSIUM SERPL-SCNC: 5 MMOL/L (ref 3.5–5.3)
PROT SERPL-MCNC: 7.4 G/DL (ref 6.1–8.1)
RBC # BLD AUTO: 4.88 MILLION/UL (ref 3.8–5.1)
SODIUM SERPL-SCNC: 134 MMOL/L (ref 135–146)
TRIGL SERPL-MCNC: 98 MG/DL
TSH SERPL-ACNC: 1.64 MIU/L (ref 0.4–4.5)
WBC # BLD AUTO: 5.9 THOUSAND/UL (ref 3.8–10.8)

## 2024-01-02 ENCOUNTER — TELEPHONE (OUTPATIENT)
Dept: FAMILY MEDICINE | Facility: CLINIC | Age: 79
End: 2024-01-02
Payer: MEDICARE

## 2024-01-02 NOTE — TELEPHONE ENCOUNTER
Left detailed message informed pt that her BP medication, Amlodipine, was sent in on 12/13/2023. Would recommend giving her pharmacy a call to get that filled. If any questions, give us a call back.

## 2024-01-02 NOTE — TELEPHONE ENCOUNTER
----- Message from Lizzy Sampson sent at 1/2/2024 12:51 PM CST -----  - 11:14- pt needs refill on bp medicine    [phone]

## 2024-01-03 DIAGNOSIS — I10 PRIMARY HYPERTENSION: Primary | ICD-10-CM

## 2024-01-03 RX ORDER — AMLODIPINE BESYLATE 10 MG/1
10 TABLET ORAL DAILY
Qty: 90 TABLET | Refills: 3 | Status: SHIPPED | OUTPATIENT
Start: 2024-01-03 | End: 2025-01-02

## 2024-01-03 NOTE — TELEPHONE ENCOUNTER
----- Message from Aurelia Maya sent at 1/3/2024 10:59 AM CST -----  Pt stated that Ally up her prescription for blood pressure from 5mg to 10mg, but it was never sent over to her pharmacy. Send to [facility] on  [address]  [phone]

## 2024-01-03 NOTE — TELEPHONE ENCOUNTER
5mg amlodipine sent in. Per [place] BP check, amlodipine to be increased to 10mg. 5mg discontinued.     Rx order set up.

## 2024-01-04 ENCOUNTER — TELEPHONE (OUTPATIENT)
Dept: FAMILY MEDICINE | Facility: CLINIC | Age: 79
End: 2024-01-04
Payer: MEDICARE

## 2024-01-04 RX ORDER — ROSUVASTATIN CALCIUM 10 MG/1
10 TABLET, COATED ORAL DAILY
Qty: 90 TABLET | Refills: 1 | Status: SHIPPED | OUTPATIENT
Start: 2024-01-04 | End: 2025-01-03

## 2024-01-04 NOTE — TELEPHONE ENCOUNTER
----- Message from Alyl Tovar NP sent at 1/3/2024  9:25 PM CST -----  Please call pt and let her know recent labs show bad cholesterol LDL levels is elevated at 160 with goal of less than 100 given HTN. I would recommend adding rosuvastatin 10mg daily to help lower cholesterol and lower risk of cardiovascular disease. Repeat CMP and lipids in 3 months  Blood sugar is a few points elevated but diabetes test A1C is in normal range. Kidney, liver, thyroid and Vitamin D level all in normal range. Sodium level has improved since last year.

## 2024-01-04 NOTE — TELEPHONE ENCOUNTER
----- Message from Lizzy Sampson sent at 1/4/2024  1:52 PM CST -----  - 1:29-pt is calling antonio barr   [phone]

## 2024-01-04 NOTE — TELEPHONE ENCOUNTER
Spoke with pt in regards to recent lab results. Verbalized per Ally  labs show bad cholesterol LDL levels is elevated at 160 with goal of less than 100 given HTN. I would recommend adding rosuvastatin 10mg daily to help lower cholesterol and lower risk of cardiovascular disease. Repeat CMP and lipids in 3 months  Blood sugar is a few points elevated but diabetes test A1C is in normal range. Kidney, liver, thyroid and Vitamin D level all in normal range. Sodium level has improved since last year. Pt acknowledge understanding. Pt is willing to start rosuvastatin.      Reminder set up.

## 2024-01-10 ENCOUNTER — CLINICAL SUPPORT (OUTPATIENT)
Dept: FAMILY MEDICINE | Facility: CLINIC | Age: 79
End: 2024-01-10
Payer: MEDICARE

## 2024-01-10 VITALS — SYSTOLIC BLOOD PRESSURE: 132 MMHG | DIASTOLIC BLOOD PRESSURE: 78 MMHG | OXYGEN SATURATION: 99 % | HEART RATE: 113 BPM

## 2024-01-10 DIAGNOSIS — I10 PRIMARY HYPERTENSION: Primary | ICD-10-CM

## 2024-01-10 NOTE — PROGRESS NOTES
Pt is here for a nurse visit, blood pressure check. Pt is taken her amlodipine 10 mg daily. Per Ally, good blood pressure continue with current medication and return to clinic as scheduled.

## 2024-03-19 ENCOUNTER — TELEPHONE (OUTPATIENT)
Dept: FAMILY MEDICINE | Facility: CLINIC | Age: 79
End: 2024-03-19
Payer: MEDICARE

## 2024-03-19 DIAGNOSIS — Z79.899 OTHER LONG TERM (CURRENT) DRUG THERAPY: ICD-10-CM

## 2024-03-19 DIAGNOSIS — E78.5 HYPERLIPIDEMIA: Primary | ICD-10-CM

## 2024-03-19 NOTE — TELEPHONE ENCOUNTER
Left mess that fasting lab is due to recheck cholesterol, orders at [facility], gave fasting instructions, updated remind me.

## 2024-03-19 NOTE — TELEPHONE ENCOUNTER
----- Message from aKmila Kowalski MA sent at 1/4/2024  2:11 PM CST -----  ----- Message from Ally Tovar NP sent at 1/3/2024  9:25 PM CST -----  Please call pt and let her know recent labs show bad cholesterol LDL levels is elevated at 160 with goal of less than 100 given HTN. I would recommend adding rosuvastatin 10mg daily to help lower cholesterol and lower risk of cardiovascular disease. Repeat CMP and lipids in 3 months  Blood sugar is a few points elevated but diabetes test A1C is in normal range. Kidney, liver, thyroid and Vitamin D level all in normal range. Sodium level has improved since last year.       Last collect 12/27/23

## 2024-04-02 ENCOUNTER — TELEPHONE (OUTPATIENT)
Dept: FAMILY MEDICINE | Facility: CLINIC | Age: 79
End: 2024-04-02
Payer: MEDICARE

## 2024-04-02 NOTE — TELEPHONE ENCOUNTER
----- Message from Kamila Kowalski MA sent at 1/4/2024  2:11 PM CST -----  ----- Message from Ally Tovar NP sent at 1/3/2024  9:25 PM CST -----  Please call pt and let her know recent labs show bad cholesterol LDL levels is elevated at 160 with goal of less than 100 given HTN. I would recommend adding rosuvastatin 10mg daily to help lower cholesterol and lower risk of cardiovascular disease. Repeat CMP and lipids in 3 months  Blood sugar is a few points elevated but diabetes test A1C is in normal range. Kidney, liver, thyroid and Vitamin D level all in normal range. Sodium level has improved since last year.       Last collect 12/27/23

## 2024-04-17 ENCOUNTER — TELEPHONE (OUTPATIENT)
Dept: FAMILY MEDICINE | Facility: CLINIC | Age: 79
End: 2024-04-17
Payer: MEDICARE

## 2024-04-17 NOTE — TELEPHONE ENCOUNTER
Spoke to pt and she stated she has no ride and she will try to find someone. Postpones 2 weeks to see if done

## 2024-04-17 NOTE — TELEPHONE ENCOUNTER
----- Message from Natasha Hua LPN sent at 4/17/2024  7:50 AM CDT -----    ----- Message -----  From: Kamila Kowalski MA  Sent: 3/18/2024  12:00 AM CDT  To: RT Philomena    ----- Message from Ally Tovar NP sent at 1/3/2024  9:25 PM CST -----  Please call pt and let her know recent labs show bad cholesterol LDL levels is elevated at 160 with goal of less than 100 given HTN. I would recommend adding rosuvastatin 10mg daily to help lower cholesterol and lower risk of cardiovascular disease. Repeat CMP and lipids in 3 months  Blood sugar is a few points elevated but diabetes test A1C is in normal range. Kidney, liver, thyroid and Vitamin D level all in normal range. Sodium level has improved since last year.       Last collect 12/27/23

## 2024-04-24 ENCOUNTER — TELEPHONE (OUTPATIENT)
Dept: FAMILY MEDICINE | Facility: CLINIC | Age: 79
End: 2024-04-24
Payer: MEDICARE

## 2024-04-24 NOTE — TELEPHONE ENCOUNTER
----- Message from Lara Muñoz sent at 2024  9:39 AM CDT -----  Edith her friend called. She wants to know if this patient  can come in when she comes in. Edith Montano  1948.  Edith is her only ride. There is a message for Edith about her appointment being rescheduled.  Edith's #   [phone] GH

## 2024-05-08 ENCOUNTER — TELEPHONE (OUTPATIENT)
Dept: FAMILY MEDICINE | Facility: CLINIC | Age: 79
End: 2024-05-08
Payer: MEDICARE

## 2024-05-08 NOTE — TELEPHONE ENCOUNTER
----- Message from RT Philomena sent at 5/8/2024  9:04 AM CDT -----    ----- Message -----  From: Natasha Hua LPN  Sent: 4/17/2024   7:50 AM CDT  To: Ally Tovar Staff      ----- Message -----  From: Kamila Kowalski MA  Sent: 3/18/2024  12:00 AM CDT  To: RT Philomena    ----- Message from Ally Tovar NP sent at 1/3/2024  9:25 PM CST -----  Please call pt and let her know recent labs show bad cholesterol LDL levels is elevated at 160 with goal of less than 100 given HTN. I would recommend adding rosuvastatin 10mg daily to help lower cholesterol and lower risk of cardiovascular disease. Repeat CMP and lipids in 3 months  Blood sugar is a few points elevated but diabetes test A1C is in normal range. Kidney, liver, thyroid and Vitamin D level all in normal range. Sodium level has improved since last year.       Last collect 12/27/23

## 2024-05-08 NOTE — TELEPHONE ENCOUNTER
Left mess that fasting lab is due to recheck cholesterol as 3 month f/u, gave fasting instructions, orders at [facility]. Updated remind me.

## 2024-05-22 ENCOUNTER — TELEPHONE (OUTPATIENT)
Dept: FAMILY MEDICINE | Facility: CLINIC | Age: 79
End: 2024-05-22
Payer: MEDICARE

## 2024-05-22 NOTE — TELEPHONE ENCOUNTER
Left mess that fasting lab is due to recheck cholesterol and to call with any questions. Updated reminder.

## 2024-06-03 ENCOUNTER — TELEPHONE (OUTPATIENT)
Dept: FAMILY MEDICINE | Facility: CLINIC | Age: 79
End: 2024-06-03
Payer: MEDICARE

## 2024-06-03 DIAGNOSIS — Z79.899 ENCOUNTER FOR LONG-TERM (CURRENT) USE OF OTHER MEDICATIONS: ICD-10-CM

## 2024-06-03 DIAGNOSIS — E78.5 HYPERLIPIDEMIA, UNSPECIFIED HYPERLIPIDEMIA TYPE: Primary | ICD-10-CM

## 2024-06-03 NOTE — TELEPHONE ENCOUNTER
Left message on voice mail, verbalizing that we have placed labs for the pt to have done before pt's upcoming appointment on 06/12/2024. Lab order are with [facility]. So you can come into the office for your labs, no appointment necessary. Just make sure you are fasting for your labs. Verbalized that if they have any question or concerns, please give our office a call.

## 2024-06-05 ENCOUNTER — TELEPHONE (OUTPATIENT)
Dept: FAMILY MEDICINE | Facility: CLINIC | Age: 79
End: 2024-06-05
Payer: MEDICARE

## 2024-06-05 NOTE — TELEPHONE ENCOUNTER
Kamila reached out to patient 6/3 that lab is due prior to visit. Pt canceled appt. 3 attempts have been made. Can I sign reminder?

## 2024-06-05 NOTE — TELEPHONE ENCOUNTER
----- Message from RT Philomena sent at 5/8/2024  9:04 AM CDT -----    ----- Message -----  From: Natasha Hua LPN  Sent: 4/17/2024   7:50 AM CDT  To: Ally Tovar Staff      ----- Message -----  From: Kamila Kowalski MA  Sent: 3/18/2024  12:00 AM CDT  To: RT Philomena    ----- Message from Ally Tovar NP sent at 1/3/2024  9:25 PM CST -----  Please call pt and let her know recent labs show bad cholesterol LDL levels is elevated at 160 with goal of less than 100 given HTN. I would recommend adding rosuvastatin 10mg daily to help lower cholesterol and lower risk of cardiovascular disease. Repeat CMP and lipids in 3 months  Blood sugar is a few points elevated but diabetes test A1C is in normal range. Kidney, liver, thyroid and Vitamin D level all in normal range. Sodium level has improved since last year.       Last collect 12/27/23   Patient is Alert, oriented x4. Did triggered bed alarms several times. Did also use call light several times with frequent reminders. Ate 100% of meal. Complains about nectar thickened, but did like ice cold lemon lime elle. Voiding spontaneously. LBM 1/9.  and 139. continue with POC.

## 2025-01-09 ENCOUNTER — TELEPHONE (OUTPATIENT)
Dept: FAMILY MEDICINE | Facility: CLINIC | Age: 80
End: 2025-01-09
Payer: MEDICARE

## 2025-01-09 NOTE — TELEPHONE ENCOUNTER
----- Message from Nisa sent at 1/9/2025  9:46 AM CST -----  Referral need to be sent to Dr. Cedeno   Office Fax# [phone]  Patient number [phone]

## 2025-01-09 NOTE — TELEPHONE ENCOUNTER
Patient has not been seen in > 1 year, stopped coming bc we did not take insurance, did not establish with any other provider. Would like to re-establish follow up with Ally now that she has [employer], to  to update insurance and schedule for her next available

## 2025-01-15 ENCOUNTER — TELEPHONE (OUTPATIENT)
Dept: FAMILY MEDICINE | Facility: CLINIC | Age: 80
End: 2025-01-15
Payer: MEDICARE

## 2025-01-15 NOTE — TELEPHONE ENCOUNTER
----- Message from Lizzy sent at 1/15/2025  8:58 AM CST -----  - 8:48- pt is calling jeet barr   [phone]

## 2025-01-15 NOTE — TELEPHONE ENCOUNTER
Will let me know when she has her drivers schedule to bring her, will want am so she can have labs too

## 2025-02-03 ENCOUNTER — TELEPHONE (OUTPATIENT)
Dept: FAMILY MEDICINE | Facility: CLINIC | Age: 80
End: 2025-02-03
Payer: MEDICARE

## 2025-02-03 DIAGNOSIS — I10 PRIMARY HYPERTENSION: ICD-10-CM

## 2025-02-03 DIAGNOSIS — Z79.899 ENCOUNTER FOR LONG-TERM (CURRENT) USE OF MEDICATIONS: Primary | ICD-10-CM

## 2025-02-03 DIAGNOSIS — E78.5 HYPERLIPIDEMIA, UNSPECIFIED HYPERLIPIDEMIA TYPE: ICD-10-CM

## 2025-02-03 NOTE — TELEPHONE ENCOUNTER
Left message on voice mail, verbalizing that we have placed labs for the pt to have done before pt's upcoming appointment on 02/17/2025. Lab order are with [facility]. So you can come into the office for your labs, no appointment necessary. Just make sure you are fasting for your labs. Verbalized that if they have any question or concerns, please give our office a call.

## 2025-02-17 ENCOUNTER — OFFICE VISIT (OUTPATIENT)
Dept: FAMILY MEDICINE | Facility: CLINIC | Age: 80
End: 2025-02-17
Payer: MEDICARE

## 2025-02-17 VITALS
WEIGHT: 175 LBS | HEART RATE: 102 BPM | HEIGHT: 62 IN | RESPIRATION RATE: 18 BRPM | OXYGEN SATURATION: 97 % | SYSTOLIC BLOOD PRESSURE: 144 MMHG | BODY MASS INDEX: 32.2 KG/M2 | DIASTOLIC BLOOD PRESSURE: 68 MMHG

## 2025-02-17 DIAGNOSIS — Z87.898 HISTORY OF SYNCOPE: ICD-10-CM

## 2025-02-17 DIAGNOSIS — Z78.0 MENOPAUSE: ICD-10-CM

## 2025-02-17 DIAGNOSIS — I10 PRIMARY HYPERTENSION: Primary | ICD-10-CM

## 2025-02-17 DIAGNOSIS — Z13.820 SCREENING FOR OSTEOPOROSIS: ICD-10-CM

## 2025-02-17 DIAGNOSIS — J45.20 MILD INTERMITTENT ASTHMA WITHOUT COMPLICATION: ICD-10-CM

## 2025-02-17 DIAGNOSIS — E78.2 MIXED HYPERLIPIDEMIA: ICD-10-CM

## 2025-02-17 RX ORDER — METOPROLOL TARTRATE 50 MG/1
50 TABLET ORAL 2 TIMES DAILY
Qty: 180 TABLET | Refills: 3 | Status: SHIPPED | OUTPATIENT
Start: 2025-02-17

## 2025-02-17 RX ORDER — ALBUTEROL SULFATE 90 UG/1
1-2 INHALANT RESPIRATORY (INHALATION) EVERY 6 HOURS PRN
Qty: 18 G | Refills: 2 | Status: SHIPPED | OUTPATIENT
Start: 2025-02-17

## 2025-02-17 RX ORDER — METOPROLOL TARTRATE 25 MG/1
25 TABLET, FILM COATED ORAL 2 TIMES DAILY
COMMUNITY
End: 2025-02-17 | Stop reason: SDUPTHER

## 2025-02-17 RX ORDER — ROSUVASTATIN CALCIUM 10 MG/1
10 TABLET, COATED ORAL DAILY
Qty: 90 TABLET | Refills: 3 | Status: SHIPPED | OUTPATIENT
Start: 2025-02-17 | End: 2026-02-17

## 2025-02-17 RX ORDER — AMLODIPINE BESYLATE 10 MG/1
10 TABLET ORAL DAILY
Qty: 90 TABLET | Refills: 3 | Status: SHIPPED | OUTPATIENT
Start: 2025-02-17 | End: 2026-02-17

## 2025-02-17 NOTE — PROGRESS NOTES
SUBJECTIVE:    Patient ID: Mirela Calderon is a 79 y.o. female.    Chief Complaint: Follow-up (No bottles// refills needed// pt states she have no complaints today //pt refused flu vaccine and dexa scan)    Pt here for regular follow-up-hypertension, lipids.  Patient last seen December 20, 2023 as a new patient    Pt reports overall she's been doing well- no c/o's today- reports she had to wait a year to follow up d/t insurance change    Hx of HTN- had angioedema on lisinopril    Reports did go see Dr. Rosa after last visit here for hx of syncope-  Had hospital admit March 2023 with syncopal episode. CT of head negative, 50% carotid stenosis, echo showed eccentric LV hypertrophy, EF 50%, mod LAE, mild-mod MR, mod TR, mention of aortic valve not well seen and cold not rule out marked thickening or even vegetation- consider EM. Pt reports Dr. Rosa added metoprolol but she reports now can't follow up with him d/t insurance- requesting referral to Dr. Whitehead    Hx of dyslipidemia- rosuvastatin added in 12/2023 but she never had f/u labs- will have labs drawn today     Hx of asthma since childhood- only has albuterol inhaler which she uses rarely.     Ambulates with cane or walker- denies any recent falls.  and lives alone. Still active around the house. Lives out in [place] [place] [facility]      Admits she drinks 3-5 beers a day and 1-2 glasses of wine          No visits with results within 6 Month(s) from this visit.   Latest known visit with results is:   Office Visit on 12/13/2023   Component Date Value Ref Range Status    WBC 12/27/2023 5.9  3.8 - 10.8 Thousand/uL Final    RBC 12/27/2023 4.88  3.80 - 5.10 Million/uL Final    Hemoglobin 12/27/2023 15.6 (H)  11.7 - 15.5 g/dL Final    Hematocrit 12/27/2023 46.0 (H)  35.0 - 45.0 % Final    MCV 12/27/2023 94.3  80.0 - 100.0 fL Final    MCH 12/27/2023 32.0  27.0 - 33.0 pg Final    MCHC 12/27/2023 33.9  32.0 - 36.0 g/dL Final    RDW 12/27/2023 11.9  11.0 - 15.0  % Final    Platelets 12/27/2023 276  140 - 400 Thousand/uL Final    MPV 12/27/2023 9.8  7.5 - 12.5 fL Final    Neutrophils, Abs 12/27/2023 2,903  1,500 - 7,800 cells/uL Final    Lymph # 12/27/2023 2,136  850 - 3,900 cells/uL Final    Mono # 12/27/2023 690  200 - 950 cells/uL Final    Eos # 12/27/2023 153  15 - 500 cells/uL Final    Baso # 12/27/2023 18  0 - 200 cells/uL Final    Neutrophils Relative 12/27/2023 49.2  % Final    Lymph % 12/27/2023 36.2  % Final    Mono % 12/27/2023 11.7  % Final    Eosinophil % 12/27/2023 2.6  % Final    Basophil % 12/27/2023 0.3  % Final    Glucose 12/27/2023 104 (H)  65 - 99 mg/dL Final    BUN 12/27/2023 13  7 - 25 mg/dL Final    Creatinine 12/27/2023 0.66  0.60 - 1.00 mg/dL Final    eGFR 12/27/2023 91  > OR = 60 mL/min/1.73m2 Final    BUN/Creatinine Ratio 12/27/2023 SEE NOTE:  6 - 22 (calc) Final    Sodium 12/27/2023 134 (L)  135 - 146 mmol/L Final    Potassium 12/27/2023 5.0  3.5 - 5.3 mmol/L Final    Chloride 12/27/2023 101  98 - 110 mmol/L Final    CO2 12/27/2023 20  20 - 32 mmol/L Final    Calcium 12/27/2023 10.2  8.6 - 10.4 mg/dL Final    Total Protein 12/27/2023 7.4  6.1 - 8.1 g/dL Final    Albumin 12/27/2023 4.5  3.6 - 5.1 g/dL Final    Globulin, Total 12/27/2023 2.9  1.9 - 3.7 g/dL (calc) Final    Albumin/Globulin Ratio 12/27/2023 1.6  1.0 - 2.5 (calc) Final    Total Bilirubin 12/27/2023 0.9  0.2 - 1.2 mg/dL Final    Alkaline Phosphatase 12/27/2023 72  37 - 153 U/L Final    AST 12/27/2023 17  10 - 35 U/L Final    ALT 12/27/2023 12  6 - 29 U/L Final    Cholesterol 12/27/2023 244 (H)  <200 mg/dL Final    HDL 12/27/2023 63  > OR = 50 mg/dL Final    Triglycerides 12/27/2023 98  <150 mg/dL Final    LDL Cholesterol 12/27/2023 160 (H)  mg/dL (calc) Final    HDL/Cholesterol Ratio 12/27/2023 3.9  <5.0 (calc) Final    Non HDL Chol. (LDL+VLDL) 12/27/2023 181 (H)  <130 mg/dL (calc) Final    TSH w/reflex to FT4 12/27/2023 1.64  0.40 - 4.50 mIU/L Final    Hemoglobin A1C 12/27/2023 4.8   <5.7 % of total Hgb Final    Vitamin D, 25-OH, Total 12/27/2023 33  30 - 100 ng/mL Final       Past Medical History:   Diagnosis Date    Asthma     Hyperlipidemia     Hypertension      History reviewed. No pertinent surgical history.  Family History   Problem Relation Name Age of Onset    Heart disease Mother      Cancer Father          bone    Diabetes Paternal Grandmother         Tests to Keep You Healthy    Last Blood Pressure <= 139/89 (2/17/2025): NO      The CVD Risk score (RISAAgostino, et al., 2008) failed to calculate for the following reasons:    The 2008 CVD risk score is only valid for ages 30 to 74     Marital Status:   Alcohol History:  reports current alcohol use of about 49.0 standard drinks of alcohol per week.  Tobacco History:  reports that she has quit smoking. Her smoking use included cigarettes. She has never been exposed to tobacco smoke. She has never used smokeless tobacco.  Drug History:  reports no history of drug use.    Health Maintenance Topics with due status: Not Due       Topic Last Completion Date    Lipid Panel 12/27/2023     Immunization History   Administered Date(s) Administered    COVID-19, MRNA, LN-S, PF (MODERNA FULL 0.5 ML DOSE) 03/01/2021, 03/31/2021, 01/14/2022    COVID-19, mRNA, LNP-S, bivalent booster, PF (Moderna Omicron)12 + YEARS 11/18/2022       Review of patient's allergies indicates:   Allergen Reactions    Lisinopril Anaphylaxis     Current Medications[1]    Review of Systems   Constitutional:  Negative for appetite change, chills, fever and unexpected weight change.   HENT:  Negative for sore throat and trouble swallowing.    Eyes:  Negative for visual disturbance.   Respiratory:  Negative for cough, shortness of breath and wheezing.    Cardiovascular:  Negative for chest pain, palpitations and leg swelling.   Gastrointestinal:  Negative for abdominal pain, constipation, diarrhea, nausea and vomiting.   Genitourinary:  Positive for frequency. Negative for  "dysuria and hematuria.   Musculoskeletal:  Positive for arthralgias (chronic right shoulder pain with limited ROM since humerus fracture last year), back pain and gait problem (ambulates wtih cane or walker).   Skin:  Negative for rash.   Neurological:  Negative for dizziness, syncope, speech difficulty, numbness and headaches.   Psychiatric/Behavioral:  Negative for confusion, dysphoric mood and sleep disturbance. The patient is not nervous/anxious.           Objective:      Vitals:    02/17/25 0747 02/17/25 0748 02/17/25 0815   BP: (!) 144/70 (!) 140/72 (!) 144/68   Pulse: 102     Resp: 18     SpO2: 97%     Weight: 79.4 kg (175 lb)     Height: 5' 2" (1.575 m)       Physical Exam  Vitals reviewed.   Constitutional:       General: She is not in acute distress.     Appearance: She is well-developed.      Comments: Elderly WF   HENT:      Head: Normocephalic and atraumatic.      Right Ear: Tympanic membrane and ear canal normal.      Left Ear: Tympanic membrane and ear canal normal.      Mouth/Throat:      Pharynx: No posterior oropharyngeal erythema.   Neck:      Vascular: No carotid bruit.   Cardiovascular:      Rate and Rhythm: Regular rhythm. Tachycardia present.      Heart sounds: No murmur heard.  Pulmonary:      Effort: Pulmonary effort is normal. No respiratory distress.      Breath sounds: Normal breath sounds. No wheezing or rales.   Abdominal:      General: There is no distension.      Palpations: Abdomen is soft.      Tenderness: There is no abdominal tenderness.   Musculoskeletal:      Cervical back: Neck supple.      Thoracic back: Deformity (marked kyphoscoliosis) present. No bony tenderness.      Right lower leg: No edema.      Left lower leg: No edema.   Lymphadenopathy:      Cervical: No cervical adenopathy.   Skin:     General: Skin is warm and dry.      Findings: No rash.   Neurological:      General: No focal deficit present.      Mental Status: She is alert and oriented to person, place, and " time.      Gait: Gait abnormal (shuffling gait with cane).   Psychiatric:         Mood and Affect: Mood normal.           Assessment:       1. Primary hypertension    2. Mixed hyperlipidemia    3. Mild intermittent asthma without complication    4. History of syncope           Plan:       1. Primary hypertension  -BP/HR slightly elevated, will increase metoprolol dose. Recommend she monitor BP at home. Referral placed for Dr. Whitehead   -     amLODIPine (NORVASC) 10 MG tablet; Take 1 tablet (10 mg total) by mouth once daily.  Dispense: 90 tablet; Refill: 3  -     metoprolol tartrate (LOPRESSOR) 50 MG tablet; Take 1 tablet (50 mg total) by mouth 2 (two) times daily.  Dispense: 180 tablet; Refill: 3  -     Ambulatory referral/consult to Cardiology; Future; Expected date: 02/24/2025    2. Mixed hyperlipidemia  -having labs drawn today, will call with results  -     rosuvastatin (CRESTOR) 10 MG tablet; Take 1 tablet (10 mg total) by mouth once daily.  Dispense: 90 tablet; Refill: 3    3. Mild intermittent asthma without complication  -well controlled, uses albuterol only rarely  -     albuterol (PROVENTIL/VENTOLIN HFA) 90 mcg/actuation inhaler; Inhale 1-2 puffs into the lungs every 6 (six) hours as needed for Wheezing.  Dispense: 18 g; Refill: 2    4. History of syncope  -no further syncope since March, 2023. referral placed to Dr. Whitehead per pt request  -     Ambulatory referral/consult to Cardiology; Future; Expected date: 02/24/2025      Follow up in about 6 months (around 8/17/2025) for labs to be drawn today, HTN.          Counseled on age and gender appropriate medical preventative services, including cancer screenings, immunizations, overall nutritional health, need for a consistent exercise regimen and an overall push towards maintaining a vigorous and active lifestyle.      2/17/2025 Ally Tovar NP           [1]   Current Outpatient Medications:     aspirin (ECOTRIN) 81 MG EC tablet, Take 81 mg by mouth  once daily., Disp: , Rfl:     EPINEPHrine (EPIPEN 2-CLAUDIA) 0.3 mg/0.3 mL AtIn, Inject 0.3 mLs (0.3 mg total) into the muscle once. As needed for anaphylactic reaction for 1 dose, Disp: 0.3 mL, Rfl: 0    albuterol (PROVENTIL/VENTOLIN HFA) 90 mcg/actuation inhaler, Inhale 1-2 puffs into the lungs every 6 (six) hours as needed for Wheezing., Disp: 18 g, Rfl: 2    amLODIPine (NORVASC) 10 MG tablet, Take 1 tablet (10 mg total) by mouth once daily., Disp: 90 tablet, Rfl: 3    cyclobenzaprine (FLEXERIL) 5 MG tablet, Take 1 tablet (5 mg total) by mouth 2 (two) times daily as needed for Muscle spasms. (Patient not taking: Reported on 2/17/2025), Disp: 40 tablet, Rfl: 2    metoprolol tartrate (LOPRESSOR) 50 MG tablet, Take 1 tablet (50 mg total) by mouth 2 (two) times daily., Disp: 180 tablet, Rfl: 3    rosuvastatin (CRESTOR) 10 MG tablet, Take 1 tablet (10 mg total) by mouth once daily., Disp: 90 tablet, Rfl: 3

## 2025-02-17 NOTE — PATIENT INSTRUCTIONS
Derik Whitehead Jr., MD- cardiology  [address] [facility]  [place] [address]  Phone: [phone]

## 2025-02-18 LAB
ALBUMIN SERPL-MCNC: 4.7 G/DL (ref 3.6–5.1)
ALBUMIN/GLOB SERPL: 1.5 (CALC) (ref 1–2.5)
ALP SERPL-CCNC: 83 U/L (ref 37–153)
ALT SERPL-CCNC: 14 U/L (ref 6–29)
AST SERPL-CCNC: 20 U/L (ref 10–35)
BASOPHILS # BLD AUTO: 43 CELLS/UL (ref 0–200)
BASOPHILS NFR BLD AUTO: 0.5 %
BILIRUB SERPL-MCNC: 0.7 MG/DL (ref 0.2–1.2)
BUN SERPL-MCNC: 12 MG/DL (ref 7–25)
BUN/CREAT SERPL: ABNORMAL (CALC) (ref 6–22)
CALCIUM SERPL-MCNC: 9.7 MG/DL (ref 8.6–10.4)
CHLORIDE SERPL-SCNC: 98 MMOL/L (ref 98–110)
CHOLEST SERPL-MCNC: 230 MG/DL
CHOLEST/HDLC SERPL: 3.1 (CALC)
CO2 SERPL-SCNC: 23 MMOL/L (ref 20–32)
CREAT SERPL-MCNC: 0.63 MG/DL (ref 0.6–1)
EGFR: 91 ML/MIN/1.73M2
EOSINOPHIL # BLD AUTO: 94 CELLS/UL (ref 15–500)
EOSINOPHIL NFR BLD AUTO: 1.1 %
ERYTHROCYTE [DISTWIDTH] IN BLOOD BY AUTOMATED COUNT: 12 % (ref 11–15)
GLOBULIN SER CALC-MCNC: 3.2 G/DL (CALC) (ref 1.9–3.7)
GLUCOSE SERPL-MCNC: 103 MG/DL (ref 65–99)
HCT VFR BLD AUTO: 45.9 % (ref 35–45)
HDLC SERPL-MCNC: 74 MG/DL
HGB BLD-MCNC: 15 G/DL (ref 11.7–15.5)
LDLC SERPL CALC-MCNC: 139 MG/DL (CALC)
LYMPHOCYTES # BLD AUTO: 1675 CELLS/UL (ref 850–3900)
LYMPHOCYTES NFR BLD AUTO: 19.7 %
MCH RBC QN AUTO: 31.7 PG (ref 27–33)
MCHC RBC AUTO-ENTMCNC: 32.7 G/DL (ref 32–36)
MCV RBC AUTO: 97 FL (ref 80–100)
MONOCYTES # BLD AUTO: 986 CELLS/UL (ref 200–950)
MONOCYTES NFR BLD AUTO: 11.6 %
NEUTROPHILS # BLD AUTO: 5704 CELLS/UL (ref 1500–7800)
NEUTROPHILS NFR BLD AUTO: 67.1 %
NONHDLC SERPL-MCNC: 156 MG/DL (CALC)
PLATELET # BLD AUTO: 288 THOUSAND/UL (ref 140–400)
PMV BLD REES-ECKER: 9.9 FL (ref 7.5–12.5)
POTASSIUM SERPL-SCNC: 4.3 MMOL/L (ref 3.5–5.3)
PROT SERPL-MCNC: 7.9 G/DL (ref 6.1–8.1)
RBC # BLD AUTO: 4.73 MILLION/UL (ref 3.8–5.1)
SODIUM SERPL-SCNC: 135 MMOL/L (ref 135–146)
TRIGL SERPL-MCNC: 80 MG/DL
TSH SERPL-ACNC: 1.5 MIU/L (ref 0.4–4.5)
WBC # BLD AUTO: 8.5 THOUSAND/UL (ref 3.8–10.8)

## 2025-02-19 ENCOUNTER — RESULTS FOLLOW-UP (OUTPATIENT)
Dept: FAMILY MEDICINE | Facility: CLINIC | Age: 80
End: 2025-02-19
Payer: MEDICARE

## 2025-02-19 NOTE — TELEPHONE ENCOUNTER
----- Message from Ally Tovar NP sent at 2/19/2025  8:05 AM CST -----  Please call pt and let her know overall her labs look okay. Her sugar was slightly elevated at 103. Kidney, liver, thyroid and blood count all in normal range. Her bad cholesterol, LDL has improved   some from 160 to 139 though goal is <100, continue current rosuvastatin  ----- Message -----  From: Blanquita Sung  Sent: 2/18/2025   6:00 AM CST  To: Ally Tovar NP

## 2025-02-19 NOTE — TELEPHONE ENCOUNTER
Spoke with pt in regards to recent lab results. Verbalized verbatim per Ally. Pt acknowledged understanding.

## 2025-08-05 ENCOUNTER — TELEPHONE (OUTPATIENT)
Dept: FAMILY MEDICINE | Facility: CLINIC | Age: 80
End: 2025-08-05
Payer: MEDICARE

## 2025-08-05 DIAGNOSIS — Z79.899 ENCOUNTER FOR LONG-TERM (CURRENT) USE OF MEDICATIONS: Primary | ICD-10-CM

## 2025-08-05 DIAGNOSIS — E78.2 MIXED HYPERLIPIDEMIA: ICD-10-CM

## 2025-08-05 NOTE — TELEPHONE ENCOUNTER
Left message on voice mail, verbalizing that we have placed labs for the pt to have done before pt's upcoming appointment on 08/18/2025. Lab order are with [facility]. So you can come into the office for your labs, no appointment necessary. Just make sure you are fasting for your labs. Verbalized that if they have any question or concerns, please give our office a call.

## 2025-08-18 ENCOUNTER — OFFICE VISIT (OUTPATIENT)
Dept: FAMILY MEDICINE | Facility: CLINIC | Age: 80
End: 2025-08-18
Payer: MEDICARE

## 2025-08-18 VITALS
DIASTOLIC BLOOD PRESSURE: 72 MMHG | HEART RATE: 110 BPM | WEIGHT: 173 LBS | SYSTOLIC BLOOD PRESSURE: 128 MMHG | HEIGHT: 62 IN | BODY MASS INDEX: 31.83 KG/M2 | OXYGEN SATURATION: 99 %

## 2025-08-18 DIAGNOSIS — I10 PRIMARY HYPERTENSION: Primary | ICD-10-CM

## 2025-08-18 DIAGNOSIS — E78.2 MIXED HYPERLIPIDEMIA: ICD-10-CM

## 2025-08-18 DIAGNOSIS — F10.90 ALCOHOL USE: ICD-10-CM

## 2025-08-18 PROCEDURE — 3288F FALL RISK ASSESSMENT DOCD: CPT | Mod: CPTII,S$GLB,, | Performed by: NURSE PRACTITIONER

## 2025-08-18 PROCEDURE — 3074F SYST BP LT 130 MM HG: CPT | Mod: CPTII,S$GLB,, | Performed by: NURSE PRACTITIONER

## 2025-08-18 PROCEDURE — 1126F AMNT PAIN NOTED NONE PRSNT: CPT | Mod: CPTII,S$GLB,, | Performed by: NURSE PRACTITIONER

## 2025-08-18 PROCEDURE — G2211 COMPLEX E/M VISIT ADD ON: HCPCS | Mod: S$GLB,,, | Performed by: NURSE PRACTITIONER

## 2025-08-18 PROCEDURE — 1160F RVW MEDS BY RX/DR IN RCRD: CPT | Mod: CPTII,S$GLB,, | Performed by: NURSE PRACTITIONER

## 2025-08-18 PROCEDURE — 3078F DIAST BP <80 MM HG: CPT | Mod: CPTII,S$GLB,, | Performed by: NURSE PRACTITIONER

## 2025-08-18 PROCEDURE — 1101F PT FALLS ASSESS-DOCD LE1/YR: CPT | Mod: CPTII,S$GLB,, | Performed by: NURSE PRACTITIONER

## 2025-08-18 PROCEDURE — 99214 OFFICE O/P EST MOD 30 MIN: CPT | Mod: S$GLB,,, | Performed by: NURSE PRACTITIONER

## 2025-08-18 PROCEDURE — 1159F MED LIST DOCD IN RCRD: CPT | Mod: CPTII,S$GLB,, | Performed by: NURSE PRACTITIONER

## 2025-08-20 ENCOUNTER — TELEPHONE (OUTPATIENT)
Dept: FAMILY MEDICINE | Facility: CLINIC | Age: 80
End: 2025-08-20
Payer: MEDICARE